# Patient Record
Sex: FEMALE | Race: WHITE | NOT HISPANIC OR LATINO | Employment: OTHER | URBAN - METROPOLITAN AREA
[De-identification: names, ages, dates, MRNs, and addresses within clinical notes are randomized per-mention and may not be internally consistent; named-entity substitution may affect disease eponyms.]

---

## 2017-01-23 ENCOUNTER — OFFICE VISIT - HEALTHEAST (OUTPATIENT)
Dept: INFECTIOUS DISEASES | Facility: CLINIC | Age: 67
End: 2017-01-23

## 2017-01-23 DIAGNOSIS — M00.861 ARTHRITIS OF RIGHT KNEE DUE TO OTHER BACTERIA (H): ICD-10-CM

## 2017-01-23 DIAGNOSIS — T84.59XD INFECTED PROSTHETIC KNEE JOINT, SUBSEQUENT ENCOUNTER: ICD-10-CM

## 2017-01-23 DIAGNOSIS — Z96.659 INFECTED PROSTHETIC KNEE JOINT, SUBSEQUENT ENCOUNTER: ICD-10-CM

## 2017-02-03 ENCOUNTER — OFFICE VISIT - HEALTHEAST (OUTPATIENT)
Dept: FAMILY MEDICINE | Facility: CLINIC | Age: 67
End: 2017-02-03

## 2017-02-03 DIAGNOSIS — I10 ESSENTIAL HYPERTENSION: ICD-10-CM

## 2017-02-22 ENCOUNTER — RECORDS - HEALTHEAST (OUTPATIENT)
Dept: ADMINISTRATIVE | Facility: OTHER | Age: 67
End: 2017-02-22

## 2017-02-22 ENCOUNTER — AMBULATORY - HEALTHEAST (OUTPATIENT)
Dept: NURSING | Facility: CLINIC | Age: 67
End: 2017-02-22

## 2017-03-01 ENCOUNTER — COMMUNICATION - HEALTHEAST (OUTPATIENT)
Dept: FAMILY MEDICINE | Facility: CLINIC | Age: 67
End: 2017-03-01

## 2017-03-22 ENCOUNTER — OFFICE VISIT - HEALTHEAST (OUTPATIENT)
Dept: FAMILY MEDICINE | Facility: CLINIC | Age: 67
End: 2017-03-22

## 2017-03-22 DIAGNOSIS — E66.9 OBESITY (BMI 30-39.9): ICD-10-CM

## 2017-03-22 DIAGNOSIS — M94.9 DISORDER OF BONE AND CARTILAGE: ICD-10-CM

## 2017-03-22 DIAGNOSIS — Z00.00 HEALTH MAINTENANCE EXAMINATION: ICD-10-CM

## 2017-03-22 DIAGNOSIS — M89.9 DISORDER OF BONE AND CARTILAGE: ICD-10-CM

## 2017-03-22 DIAGNOSIS — M17.10 LOCALIZED OSTEOARTHROSIS, LOWER LEG: ICD-10-CM

## 2017-03-22 DIAGNOSIS — E55.9 VITAMIN D DEFICIENCY: ICD-10-CM

## 2017-03-22 LAB
CHOLEST SERPL-MCNC: 236 MG/DL
FASTING STATUS PATIENT QL REPORTED: YES
HDLC SERPL-MCNC: 42 MG/DL
LDLC SERPL CALC-MCNC: 136 MG/DL
TRIGL SERPL-MCNC: 290 MG/DL

## 2017-03-22 ASSESSMENT — MIFFLIN-ST. JEOR: SCORE: 1348.05

## 2017-03-23 ENCOUNTER — COMMUNICATION - HEALTHEAST (OUTPATIENT)
Dept: FAMILY MEDICINE | Facility: CLINIC | Age: 67
End: 2017-03-23

## 2017-03-29 ENCOUNTER — RECORDS - HEALTHEAST (OUTPATIENT)
Dept: ADMINISTRATIVE | Facility: OTHER | Age: 67
End: 2017-03-29

## 2017-03-29 ENCOUNTER — RECORDS - HEALTHEAST (OUTPATIENT)
Dept: BONE DENSITY | Facility: CLINIC | Age: 67
End: 2017-03-29

## 2017-03-29 ENCOUNTER — COMMUNICATION - HEALTHEAST (OUTPATIENT)
Dept: FAMILY MEDICINE | Facility: CLINIC | Age: 67
End: 2017-03-29

## 2017-03-29 ENCOUNTER — HOSPITAL ENCOUNTER (OUTPATIENT)
Dept: MAMMOGRAPHY | Facility: HOSPITAL | Age: 67
Discharge: HOME | End: 2017-03-29
Attending: NURSE PRACTITIONER

## 2017-03-29 DIAGNOSIS — Z00.00 ENCOUNTER FOR GENERAL ADULT MEDICAL EXAMINATION WITHOUT ABNORMAL FINDINGS: ICD-10-CM

## 2017-03-29 DIAGNOSIS — Z00.00 HEALTH MAINTENANCE EXAMINATION: ICD-10-CM

## 2017-10-09 ENCOUNTER — COMMUNICATION - HEALTHEAST (OUTPATIENT)
Dept: FAMILY MEDICINE | Facility: CLINIC | Age: 67
End: 2017-10-09

## 2017-10-10 ENCOUNTER — AMBULATORY - HEALTHEAST (OUTPATIENT)
Dept: FAMILY MEDICINE | Facility: CLINIC | Age: 67
End: 2017-10-10

## 2017-10-11 ENCOUNTER — AMBULATORY - HEALTHEAST (OUTPATIENT)
Dept: FAMILY MEDICINE | Facility: CLINIC | Age: 67
End: 2017-10-11

## 2017-10-18 ENCOUNTER — AMBULATORY - HEALTHEAST (OUTPATIENT)
Dept: FAMILY MEDICINE | Facility: CLINIC | Age: 67
End: 2017-10-18

## 2017-10-18 ENCOUNTER — AMBULATORY - HEALTHEAST (OUTPATIENT)
Dept: LAB | Facility: CLINIC | Age: 67
End: 2017-10-18

## 2017-10-18 ENCOUNTER — COMMUNICATION - HEALTHEAST (OUTPATIENT)
Dept: FAMILY MEDICINE | Facility: CLINIC | Age: 67
End: 2017-10-18

## 2017-10-18 DIAGNOSIS — E78.5 ELEVATED FASTING LIPID PROFILE: ICD-10-CM

## 2017-10-18 DIAGNOSIS — R74.8 ELEVATED LIPASE: ICD-10-CM

## 2017-10-18 LAB
CHOLEST SERPL-MCNC: 271 MG/DL
FASTING STATUS PATIENT QL REPORTED: YES
HDLC SERPL-MCNC: 40 MG/DL
LDLC SERPL CALC-MCNC: 178 MG/DL
TRIGL SERPL-MCNC: 266 MG/DL

## 2017-10-25 ENCOUNTER — AMBULATORY - HEALTHEAST (OUTPATIENT)
Dept: FAMILY MEDICINE | Facility: CLINIC | Age: 67
End: 2017-10-25

## 2017-11-02 ENCOUNTER — COMMUNICATION - HEALTHEAST (OUTPATIENT)
Dept: SCHEDULING | Facility: CLINIC | Age: 67
End: 2017-11-02

## 2017-11-08 ENCOUNTER — COMMUNICATION - HEALTHEAST (OUTPATIENT)
Dept: FAMILY MEDICINE | Facility: CLINIC | Age: 67
End: 2017-11-08

## 2018-01-10 ENCOUNTER — COMMUNICATION - HEALTHEAST (OUTPATIENT)
Dept: FAMILY MEDICINE | Facility: CLINIC | Age: 68
End: 2018-01-10

## 2018-01-10 DIAGNOSIS — I10 BENIGN ESSENTIAL HTN: ICD-10-CM

## 2018-01-11 ENCOUNTER — OFFICE VISIT - HEALTHEAST (OUTPATIENT)
Dept: FAMILY MEDICINE | Facility: CLINIC | Age: 68
End: 2018-01-11

## 2018-01-11 DIAGNOSIS — I10 BENIGN ESSENTIAL HTN: ICD-10-CM

## 2018-01-11 DIAGNOSIS — J20.9 ACUTE BRONCHITIS: ICD-10-CM

## 2018-02-21 ENCOUNTER — AMBULATORY - HEALTHEAST (OUTPATIENT)
Dept: SCHEDULING | Facility: CLINIC | Age: 68
End: 2018-02-21

## 2018-02-21 ENCOUNTER — AMBULATORY - HEALTHEAST (OUTPATIENT)
Dept: FAMILY MEDICINE | Facility: CLINIC | Age: 68
End: 2018-02-21

## 2018-02-21 ENCOUNTER — COMMUNICATION - HEALTHEAST (OUTPATIENT)
Dept: FAMILY MEDICINE | Facility: CLINIC | Age: 68
End: 2018-02-21

## 2018-02-21 ENCOUNTER — RECORDS - HEALTHEAST (OUTPATIENT)
Dept: GENERAL RADIOLOGY | Facility: CLINIC | Age: 68
End: 2018-02-21

## 2018-02-21 ENCOUNTER — OFFICE VISIT - HEALTHEAST (OUTPATIENT)
Dept: FAMILY MEDICINE | Facility: CLINIC | Age: 68
End: 2018-02-21

## 2018-02-21 DIAGNOSIS — N85.2 BULKY OR ENLARGED UTERUS: ICD-10-CM

## 2018-02-21 DIAGNOSIS — M25.511 CHRONIC RIGHT SHOULDER PAIN: ICD-10-CM

## 2018-02-21 DIAGNOSIS — N39.3 STRESS INCONTINENCE OF URINE: ICD-10-CM

## 2018-02-21 DIAGNOSIS — G89.29 CHRONIC RIGHT SHOULDER PAIN: ICD-10-CM

## 2018-02-21 DIAGNOSIS — M25.811 SHOULDER IMPINGEMENT, RIGHT: ICD-10-CM

## 2018-02-21 DIAGNOSIS — K52.9 COLITIS: ICD-10-CM

## 2018-02-21 DIAGNOSIS — M94.9 DISORDER OF BONE AND CARTILAGE: ICD-10-CM

## 2018-02-21 DIAGNOSIS — I10 BENIGN ESSENTIAL HTN: ICD-10-CM

## 2018-02-21 DIAGNOSIS — N95.2 POSTMENOPAUSAL ATROPHIC VAGINITIS: ICD-10-CM

## 2018-02-21 DIAGNOSIS — G89.29 OTHER CHRONIC PAIN: ICD-10-CM

## 2018-02-21 DIAGNOSIS — J31.0 CHRONIC RHINITIS: ICD-10-CM

## 2018-02-21 DIAGNOSIS — E78.00 HYPERCHOLESTEREMIA: ICD-10-CM

## 2018-02-21 DIAGNOSIS — Z00.00 ROUTINE GENERAL MEDICAL EXAMINATION AT A HEALTH CARE FACILITY: ICD-10-CM

## 2018-02-21 DIAGNOSIS — M25.511 PAIN IN RIGHT SHOULDER: ICD-10-CM

## 2018-02-21 DIAGNOSIS — M89.9 DISORDER OF BONE AND CARTILAGE: ICD-10-CM

## 2018-02-21 LAB
ALBUMIN SERPL-MCNC: 3.7 G/DL (ref 3.5–5)
ALP SERPL-CCNC: 84 U/L (ref 45–120)
ALT SERPL W P-5'-P-CCNC: 18 U/L (ref 0–45)
ANION GAP SERPL CALCULATED.3IONS-SCNC: 10 MMOL/L (ref 5–18)
AST SERPL W P-5'-P-CCNC: 22 U/L (ref 0–40)
BILIRUB SERPL-MCNC: 0.4 MG/DL (ref 0–1)
BUN SERPL-MCNC: 20 MG/DL (ref 8–22)
CALCIUM SERPL-MCNC: 9.9 MG/DL (ref 8.5–10.5)
CHLORIDE BLD-SCNC: 105 MMOL/L (ref 98–107)
CO2 SERPL-SCNC: 27 MMOL/L (ref 22–31)
CREAT SERPL-MCNC: 0.82 MG/DL (ref 0.6–1.1)
GFR SERPL CREATININE-BSD FRML MDRD: >60 ML/MIN/1.73M2
GLUCOSE BLD-MCNC: 98 MG/DL (ref 70–125)
POTASSIUM BLD-SCNC: 4.6 MMOL/L (ref 3.5–5)
PROT SERPL-MCNC: 7.2 G/DL (ref 6–8)
SODIUM SERPL-SCNC: 142 MMOL/L (ref 136–145)

## 2018-02-21 ASSESSMENT — MIFFLIN-ST. JEOR: SCORE: 1345.21

## 2018-02-28 ENCOUNTER — AMBULATORY - HEALTHEAST (OUTPATIENT)
Dept: SCHEDULING | Facility: CLINIC | Age: 68
End: 2018-02-28

## 2018-02-28 ENCOUNTER — COMMUNICATION - HEALTHEAST (OUTPATIENT)
Dept: FAMILY MEDICINE | Facility: CLINIC | Age: 68
End: 2018-02-28

## 2018-02-28 ENCOUNTER — AMBULATORY - HEALTHEAST (OUTPATIENT)
Dept: FAMILY MEDICINE | Facility: CLINIC | Age: 68
End: 2018-02-28

## 2018-02-28 DIAGNOSIS — N83.8 OVARIAN MASS: ICD-10-CM

## 2018-02-28 DIAGNOSIS — R19.00 PELVIC MASS IN FEMALE: ICD-10-CM

## 2018-03-04 ENCOUNTER — AMBULATORY - HEALTHEAST (OUTPATIENT)
Dept: FAMILY MEDICINE | Facility: CLINIC | Age: 68
End: 2018-03-04

## 2018-03-05 ENCOUNTER — AMBULATORY - HEALTHEAST (OUTPATIENT)
Dept: FAMILY MEDICINE | Facility: CLINIC | Age: 68
End: 2018-03-05

## 2018-03-05 ENCOUNTER — COMMUNICATION - HEALTHEAST (OUTPATIENT)
Dept: ONCOLOGY | Facility: HOSPITAL | Age: 68
End: 2018-03-05

## 2018-03-05 ENCOUNTER — COMMUNICATION - HEALTHEAST (OUTPATIENT)
Dept: FAMILY MEDICINE | Facility: CLINIC | Age: 68
End: 2018-03-05

## 2018-03-05 DIAGNOSIS — C56.1 MALIGNANT NEOPLASM OF RIGHT OVARY (H): ICD-10-CM

## 2018-03-05 DIAGNOSIS — N83.8 OVARIAN MASS: ICD-10-CM

## 2018-03-06 ENCOUNTER — COMMUNICATION - HEALTHEAST (OUTPATIENT)
Dept: FAMILY MEDICINE | Facility: CLINIC | Age: 68
End: 2018-03-06

## 2018-03-08 ENCOUNTER — RECORDS - HEALTHEAST (OUTPATIENT)
Dept: ADMINISTRATIVE | Facility: OTHER | Age: 68
End: 2018-03-08

## 2018-03-09 ENCOUNTER — COMMUNICATION - HEALTHEAST (OUTPATIENT)
Dept: FAMILY MEDICINE | Facility: CLINIC | Age: 68
End: 2018-03-09

## 2018-03-09 ENCOUNTER — OFFICE VISIT - HEALTHEAST (OUTPATIENT)
Dept: FAMILY MEDICINE | Facility: CLINIC | Age: 68
End: 2018-03-09

## 2018-03-09 DIAGNOSIS — N83.8 OVARIAN MASS: ICD-10-CM

## 2018-03-09 DIAGNOSIS — Z01.818 PRE-OP EXAMINATION: ICD-10-CM

## 2018-03-09 LAB
ATRIAL RATE - MUSE: 76 BPM
DIASTOLIC BLOOD PRESSURE - MUSE: NORMAL MMHG
HGB BLD-MCNC: 13.3 G/DL (ref 12–16)
INTERPRETATION ECG - MUSE: NORMAL
P AXIS - MUSE: 51 DEGREES
PR INTERVAL - MUSE: 138 MS
QRS DURATION - MUSE: 74 MS
QT - MUSE: 368 MS
QTC - MUSE: 414 MS
R AXIS - MUSE: 13 DEGREES
SYSTOLIC BLOOD PRESSURE - MUSE: NORMAL MMHG
T AXIS - MUSE: 38 DEGREES
VENTRICULAR RATE- MUSE: 76 BPM

## 2018-03-09 ASSESSMENT — MIFFLIN-ST. JEOR: SCORE: 1334.72

## 2018-03-12 ENCOUNTER — RECORDS - HEALTHEAST (OUTPATIENT)
Dept: ADMINISTRATIVE | Facility: OTHER | Age: 68
End: 2018-03-12

## 2018-03-29 ENCOUNTER — RECORDS - HEALTHEAST (OUTPATIENT)
Dept: ADMINISTRATIVE | Facility: OTHER | Age: 68
End: 2018-03-29

## 2018-07-11 ENCOUNTER — COMMUNICATION - HEALTHEAST (OUTPATIENT)
Dept: FAMILY MEDICINE | Facility: CLINIC | Age: 68
End: 2018-07-11

## 2018-07-18 ENCOUNTER — RECORDS - HEALTHEAST (OUTPATIENT)
Dept: ADMINISTRATIVE | Facility: OTHER | Age: 68
End: 2018-07-18

## 2018-08-29 ENCOUNTER — RECORDS - HEALTHEAST (OUTPATIENT)
Dept: ADMINISTRATIVE | Facility: OTHER | Age: 68
End: 2018-08-29

## 2018-09-26 ENCOUNTER — AMBULATORY - HEALTHEAST (OUTPATIENT)
Dept: LAB | Facility: CLINIC | Age: 68
End: 2018-09-26

## 2018-09-26 DIAGNOSIS — E78.00 HYPERCHOLESTEREMIA: ICD-10-CM

## 2018-09-26 LAB
ALBUMIN SERPL-MCNC: 4 G/DL (ref 3.5–5)
ALP SERPL-CCNC: 76 U/L (ref 45–120)
ALT SERPL W P-5'-P-CCNC: 19 U/L (ref 0–45)
AST SERPL W P-5'-P-CCNC: 20 U/L (ref 0–40)
BILIRUB DIRECT SERPL-MCNC: 0.1 MG/DL
BILIRUB SERPL-MCNC: 0.3 MG/DL (ref 0–1)
CHOLEST SERPL-MCNC: 196 MG/DL
FASTING STATUS PATIENT QL REPORTED: YES
HDLC SERPL-MCNC: 52 MG/DL
LDLC SERPL CALC-MCNC: 118 MG/DL
PROT SERPL-MCNC: 7 G/DL (ref 6–8)
TRIGL SERPL-MCNC: 129 MG/DL

## 2018-09-28 ENCOUNTER — COMMUNICATION - HEALTHEAST (OUTPATIENT)
Dept: FAMILY MEDICINE | Facility: CLINIC | Age: 68
End: 2018-09-28

## 2018-09-29 ENCOUNTER — AMBULATORY - HEALTHEAST (OUTPATIENT)
Dept: FAMILY MEDICINE | Facility: CLINIC | Age: 68
End: 2018-09-29

## 2019-01-07 ENCOUNTER — OFFICE VISIT - HEALTHEAST (OUTPATIENT)
Dept: FAMILY MEDICINE | Facility: CLINIC | Age: 69
End: 2019-01-07

## 2019-01-07 DIAGNOSIS — J20.9 ACUTE BRONCHITIS WITH SYMPTOMS GREATER THAN 10 DAYS: ICD-10-CM

## 2019-02-06 ENCOUNTER — COMMUNICATION - HEALTHEAST (OUTPATIENT)
Dept: FAMILY MEDICINE | Facility: CLINIC | Age: 69
End: 2019-02-06

## 2019-03-19 ENCOUNTER — OFFICE VISIT - HEALTHEAST (OUTPATIENT)
Dept: FAMILY MEDICINE | Facility: CLINIC | Age: 69
End: 2019-03-19

## 2019-03-19 DIAGNOSIS — R35.0 URINARY FREQUENCY: ICD-10-CM

## 2019-03-19 DIAGNOSIS — Z12.31 VISIT FOR SCREENING MAMMOGRAM: ICD-10-CM

## 2019-03-19 DIAGNOSIS — Z01.818 ENCOUNTER FOR PREOPERATIVE EXAMINATION FOR GENERAL SURGICAL PROCEDURE: ICD-10-CM

## 2019-03-19 DIAGNOSIS — E78.00 HYPERCHOLESTEREMIA: ICD-10-CM

## 2019-03-19 DIAGNOSIS — M17.10 LOCALIZED OSTEOARTHROSIS, LOWER LEG: ICD-10-CM

## 2019-03-19 DIAGNOSIS — Z01.818 PREOP GENERAL PHYSICAL EXAM: ICD-10-CM

## 2019-03-19 DIAGNOSIS — I10 BENIGN ESSENTIAL HTN: ICD-10-CM

## 2019-03-19 DIAGNOSIS — E66.9 OBESITY (BMI 30-39.9): ICD-10-CM

## 2019-03-19 LAB
ALBUMIN SERPL-MCNC: 3.9 G/DL (ref 3.5–5)
ALBUMIN UR-MCNC: NEGATIVE MG/DL
ANION GAP SERPL CALCULATED.3IONS-SCNC: 10 MMOL/L (ref 5–18)
APPEARANCE UR: CLEAR
BACTERIA #/AREA URNS HPF: ABNORMAL HPF
BILIRUB UR QL STRIP: NEGATIVE
BUN SERPL-MCNC: 24 MG/DL (ref 8–28)
CALCIUM SERPL-MCNC: 10.3 MG/DL (ref 8.5–10.5)
CHLORIDE BLD-SCNC: 106 MMOL/L (ref 98–107)
CO2 SERPL-SCNC: 25 MMOL/L (ref 22–31)
COLOR UR AUTO: YELLOW
CREAT SERPL-MCNC: 1 MG/DL (ref 0.6–1.1)
GFR SERPL CREATININE-BSD FRML MDRD: 55 ML/MIN/1.73M2
GLUCOSE BLD-MCNC: 96 MG/DL (ref 70–125)
GLUCOSE UR STRIP-MCNC: NEGATIVE MG/DL
HGB BLD-MCNC: 12.6 G/DL (ref 12–16)
HGB UR QL STRIP: NEGATIVE
KETONES UR STRIP-MCNC: ABNORMAL MG/DL
LEUKOCYTE ESTERASE UR QL STRIP: ABNORMAL
NITRATE UR QL: NEGATIVE
PH UR STRIP: 5 [PH] (ref 5–8)
PHOSPHATE SERPL-MCNC: 3.9 MG/DL (ref 2.5–4.5)
POTASSIUM BLD-SCNC: 4.4 MMOL/L (ref 3.5–5)
RBC #/AREA URNS AUTO: ABNORMAL HPF
SODIUM SERPL-SCNC: 141 MMOL/L (ref 136–145)
SP GR UR STRIP: >=1.03 (ref 1–1.03)
SQUAMOUS #/AREA URNS AUTO: ABNORMAL LPF
UROBILINOGEN UR STRIP-ACNC: ABNORMAL
WBC #/AREA URNS AUTO: ABNORMAL HPF

## 2019-03-20 LAB
ATRIAL RATE - MUSE: 71 BPM
BACTERIA SPEC CULT: NO GROWTH
DIASTOLIC BLOOD PRESSURE - MUSE: NORMAL MMHG
INTERPRETATION ECG - MUSE: NORMAL
P AXIS - MUSE: 50 DEGREES
PR INTERVAL - MUSE: 144 MS
QRS DURATION - MUSE: 78 MS
QT - MUSE: 392 MS
QTC - MUSE: 425 MS
R AXIS - MUSE: 29 DEGREES
SYSTOLIC BLOOD PRESSURE - MUSE: NORMAL MMHG
T AXIS - MUSE: 42 DEGREES
VENTRICULAR RATE- MUSE: 71 BPM

## 2019-03-25 ASSESSMENT — MIFFLIN-ST. JEOR: SCORE: 1356.83

## 2019-03-26 ENCOUNTER — ANESTHESIA - HEALTHEAST (OUTPATIENT)
Dept: SURGERY | Facility: CLINIC | Age: 69
End: 2019-03-26

## 2019-03-27 ENCOUNTER — DOCUMENTATION ONLY (OUTPATIENT)
Dept: OTHER | Facility: CLINIC | Age: 69
End: 2019-03-27

## 2019-03-27 ENCOUNTER — SURGERY - HEALTHEAST (OUTPATIENT)
Dept: SURGERY | Facility: CLINIC | Age: 69
End: 2019-03-27

## 2019-03-27 ENCOUNTER — AMBULATORY - HEALTHEAST (OUTPATIENT)
Dept: OTHER | Facility: CLINIC | Age: 69
End: 2019-03-27

## 2019-03-27 ASSESSMENT — MIFFLIN-ST. JEOR: SCORE: 1336.14

## 2019-03-28 ENCOUNTER — HOME CARE/HOSPICE - HEALTHEAST (OUTPATIENT)
Dept: HOME HEALTH SERVICES | Facility: HOME HEALTH | Age: 69
End: 2019-03-28

## 2019-04-01 ENCOUNTER — HOME CARE/HOSPICE - HEALTHEAST (OUTPATIENT)
Dept: HOME HEALTH SERVICES | Facility: HOME HEALTH | Age: 69
End: 2019-04-01

## 2019-04-01 ENCOUNTER — RECORDS - HEALTHEAST (OUTPATIENT)
Dept: ADMINISTRATIVE | Facility: OTHER | Age: 69
End: 2019-04-01

## 2019-04-01 ENCOUNTER — COMMUNICATION - HEALTHEAST (OUTPATIENT)
Dept: FAMILY MEDICINE | Facility: CLINIC | Age: 69
End: 2019-04-01

## 2019-04-02 ENCOUNTER — HOME CARE/HOSPICE - HEALTHEAST (OUTPATIENT)
Dept: HOME HEALTH SERVICES | Facility: HOME HEALTH | Age: 69
End: 2019-04-02

## 2019-04-04 ENCOUNTER — HOME CARE/HOSPICE - HEALTHEAST (OUTPATIENT)
Dept: HOME HEALTH SERVICES | Facility: HOME HEALTH | Age: 69
End: 2019-04-04

## 2019-04-05 ENCOUNTER — HOME CARE/HOSPICE - HEALTHEAST (OUTPATIENT)
Dept: HOME HEALTH SERVICES | Facility: HOME HEALTH | Age: 69
End: 2019-04-05

## 2019-04-08 ENCOUNTER — HOME CARE/HOSPICE - HEALTHEAST (OUTPATIENT)
Dept: HOME HEALTH SERVICES | Facility: HOME HEALTH | Age: 69
End: 2019-04-08

## 2019-04-09 ENCOUNTER — RECORDS - HEALTHEAST (OUTPATIENT)
Dept: ADMINISTRATIVE | Facility: OTHER | Age: 69
End: 2019-04-09

## 2019-04-11 ENCOUNTER — HOME CARE/HOSPICE - HEALTHEAST (OUTPATIENT)
Dept: HOME HEALTH SERVICES | Facility: HOME HEALTH | Age: 69
End: 2019-04-11

## 2019-04-12 ENCOUNTER — HOME CARE/HOSPICE - HEALTHEAST (OUTPATIENT)
Dept: HOME HEALTH SERVICES | Facility: HOME HEALTH | Age: 69
End: 2019-04-12

## 2019-04-15 ENCOUNTER — HOME CARE/HOSPICE - HEALTHEAST (OUTPATIENT)
Dept: HOME HEALTH SERVICES | Facility: HOME HEALTH | Age: 69
End: 2019-04-15

## 2019-04-15 ENCOUNTER — COMMUNICATION - HEALTHEAST (OUTPATIENT)
Dept: PHYSICAL THERAPY | Facility: CLINIC | Age: 69
End: 2019-04-15

## 2019-04-18 ENCOUNTER — HOME CARE/HOSPICE - HEALTHEAST (OUTPATIENT)
Dept: HOME HEALTH SERVICES | Facility: HOME HEALTH | Age: 69
End: 2019-04-18

## 2019-04-25 ENCOUNTER — AMBULATORY - HEALTHEAST (OUTPATIENT)
Dept: NURSING | Facility: CLINIC | Age: 69
End: 2019-04-25

## 2019-04-25 ENCOUNTER — COMMUNICATION - HEALTHEAST (OUTPATIENT)
Dept: FAMILY MEDICINE | Facility: CLINIC | Age: 69
End: 2019-04-25

## 2019-04-25 DIAGNOSIS — I10 BENIGN ESSENTIAL HTN: ICD-10-CM

## 2019-04-26 ENCOUNTER — COMMUNICATION - HEALTHEAST (OUTPATIENT)
Dept: FAMILY MEDICINE | Facility: CLINIC | Age: 69
End: 2019-04-26

## 2019-04-30 ENCOUNTER — OFFICE VISIT - HEALTHEAST (OUTPATIENT)
Dept: FAMILY MEDICINE | Facility: CLINIC | Age: 69
End: 2019-04-30

## 2019-04-30 DIAGNOSIS — I10 BENIGN ESSENTIAL HYPERTENSION: ICD-10-CM

## 2019-04-30 DIAGNOSIS — M25.522 PAIN IN JOINT, UPPER ARM, LEFT: ICD-10-CM

## 2019-05-07 ENCOUNTER — RECORDS - HEALTHEAST (OUTPATIENT)
Dept: ADMINISTRATIVE | Facility: OTHER | Age: 69
End: 2019-05-07

## 2019-05-07 ENCOUNTER — OFFICE VISIT - HEALTHEAST (OUTPATIENT)
Dept: FAMILY MEDICINE | Facility: CLINIC | Age: 69
End: 2019-05-07

## 2019-05-07 DIAGNOSIS — N95.2 ATROPHIC VAGINITIS: ICD-10-CM

## 2019-05-07 DIAGNOSIS — D62 ANEMIA DUE TO BLOOD LOSS, ACUTE: ICD-10-CM

## 2019-05-07 DIAGNOSIS — D50.8 OTHER IRON DEFICIENCY ANEMIA: ICD-10-CM

## 2019-05-07 DIAGNOSIS — E66.9 OBESITY (BMI 30-39.9): ICD-10-CM

## 2019-05-07 DIAGNOSIS — I10 BENIGN ESSENTIAL HTN: ICD-10-CM

## 2019-05-07 DIAGNOSIS — E78.00 HYPERCHOLESTEREMIA: ICD-10-CM

## 2019-05-07 DIAGNOSIS — M94.9 DISORDER OF BONE AND CARTILAGE: ICD-10-CM

## 2019-05-07 DIAGNOSIS — Z00.00 ROUTINE GENERAL MEDICAL EXAMINATION AT A HEALTH CARE FACILITY: ICD-10-CM

## 2019-05-07 DIAGNOSIS — G25.81 RESTLESS LEG SYNDROME: ICD-10-CM

## 2019-05-07 DIAGNOSIS — M89.9 DISORDER OF BONE AND CARTILAGE: ICD-10-CM

## 2019-05-07 DIAGNOSIS — Z12.31 ENCOUNTER FOR SCREENING MAMMOGRAM FOR MALIGNANT NEOPLASM OF BREAST: ICD-10-CM

## 2019-05-07 DIAGNOSIS — Z96.652 STATUS POST TOTAL LEFT KNEE REPLACEMENT: ICD-10-CM

## 2019-05-07 LAB
ERYTHROCYTE [DISTWIDTH] IN BLOOD BY AUTOMATED COUNT: 11.9 % (ref 11–14.5)
FERRITIN SERPL-MCNC: 88 NG/ML (ref 10–130)
HCT VFR BLD AUTO: 36.2 % (ref 35–47)
HGB BLD-MCNC: 11.7 G/DL (ref 12–16)
MCH RBC QN AUTO: 28.2 PG (ref 27–34)
MCHC RBC AUTO-ENTMCNC: 32.2 G/DL (ref 32–36)
MCV RBC AUTO: 88 FL (ref 80–100)
PLATELET # BLD AUTO: 367 THOU/UL (ref 140–440)
PMV BLD AUTO: 8.3 FL (ref 7–10)
RBC # BLD AUTO: 4.13 MILL/UL (ref 3.8–5.4)
WBC: 6.8 THOU/UL (ref 4–11)

## 2019-05-07 ASSESSMENT — MIFFLIN-ST. JEOR: SCORE: 1321.14

## 2019-08-28 ENCOUNTER — HOSPITAL ENCOUNTER (OUTPATIENT)
Dept: MAMMOGRAPHY | Facility: CLINIC | Age: 69
Discharge: HOME | End: 2019-08-28
Attending: FAMILY MEDICINE

## 2019-08-28 DIAGNOSIS — Z12.31 ENCOUNTER FOR SCREENING MAMMOGRAM FOR MALIGNANT NEOPLASM OF BREAST: ICD-10-CM

## 2019-08-28 DIAGNOSIS — Z00.00 ROUTINE GENERAL MEDICAL EXAMINATION AT A HEALTH CARE FACILITY: ICD-10-CM

## 2019-09-10 ENCOUNTER — RECORDS - HEALTHEAST (OUTPATIENT)
Dept: ADMINISTRATIVE | Facility: OTHER | Age: 69
End: 2019-09-10

## 2019-11-05 ENCOUNTER — COMMUNICATION - HEALTHEAST (OUTPATIENT)
Dept: FAMILY MEDICINE | Facility: CLINIC | Age: 69
End: 2019-11-05

## 2019-11-05 DIAGNOSIS — I10 BENIGN ESSENTIAL HTN: ICD-10-CM

## 2019-11-05 DIAGNOSIS — E78.00 HYPERCHOLESTEREMIA: ICD-10-CM

## 2019-12-11 ENCOUNTER — OFFICE VISIT - HEALTHEAST (OUTPATIENT)
Dept: FAMILY MEDICINE | Facility: CLINIC | Age: 69
End: 2019-12-11

## 2019-12-11 DIAGNOSIS — I10 BENIGN ESSENTIAL HTN: ICD-10-CM

## 2019-12-11 DIAGNOSIS — Z23 FLU VACCINE NEED: ICD-10-CM

## 2019-12-11 DIAGNOSIS — K21.9 GASTROESOPHAGEAL REFLUX DISEASE WITHOUT ESOPHAGITIS: ICD-10-CM

## 2019-12-11 DIAGNOSIS — Z78.0 POST-MENOPAUSAL: ICD-10-CM

## 2019-12-11 DIAGNOSIS — E78.00 HYPERCHOLESTEREMIA: ICD-10-CM

## 2019-12-11 LAB
ALBUMIN SERPL-MCNC: 4 G/DL (ref 3.5–5)
ALP SERPL-CCNC: 88 U/L (ref 45–120)
ALT SERPL W P-5'-P-CCNC: 18 U/L (ref 0–45)
ANION GAP SERPL CALCULATED.3IONS-SCNC: 10 MMOL/L (ref 5–18)
AST SERPL W P-5'-P-CCNC: 22 U/L (ref 0–40)
BILIRUB SERPL-MCNC: 0.3 MG/DL (ref 0–1)
BUN SERPL-MCNC: 22 MG/DL (ref 8–28)
CALCIUM SERPL-MCNC: 10.1 MG/DL (ref 8.5–10.5)
CHLORIDE BLD-SCNC: 107 MMOL/L (ref 98–107)
CHOLEST SERPL-MCNC: 220 MG/DL
CO2 SERPL-SCNC: 25 MMOL/L (ref 22–31)
CREAT SERPL-MCNC: 0.96 MG/DL (ref 0.6–1.1)
FASTING STATUS PATIENT QL REPORTED: YES
GFR SERPL CREATININE-BSD FRML MDRD: 57 ML/MIN/1.73M2
GLUCOSE BLD-MCNC: 93 MG/DL (ref 70–125)
HDLC SERPL-MCNC: 51 MG/DL
LDLC SERPL CALC-MCNC: 146 MG/DL
POTASSIUM BLD-SCNC: 4.5 MMOL/L (ref 3.5–5)
PROT SERPL-MCNC: 7.1 G/DL (ref 6–8)
SODIUM SERPL-SCNC: 142 MMOL/L (ref 136–145)
TRIGL SERPL-MCNC: 116 MG/DL

## 2019-12-11 ASSESSMENT — MIFFLIN-ST. JEOR: SCORE: 1321.8

## 2019-12-12 ENCOUNTER — AMBULATORY - HEALTHEAST (OUTPATIENT)
Dept: SCHEDULING | Facility: CLINIC | Age: 69
End: 2019-12-12

## 2019-12-12 ENCOUNTER — COMMUNICATION - HEALTHEAST (OUTPATIENT)
Dept: FAMILY MEDICINE | Facility: CLINIC | Age: 69
End: 2019-12-12

## 2019-12-12 DIAGNOSIS — Z78.0 POST-MENOPAUSAL: ICD-10-CM

## 2019-12-18 ENCOUNTER — TRANSFERRED RECORDS (OUTPATIENT)
Dept: HEALTH INFORMATION MANAGEMENT | Facility: CLINIC | Age: 69
End: 2019-12-18

## 2019-12-21 ENCOUNTER — COMMUNICATION - HEALTHEAST (OUTPATIENT)
Dept: FAMILY MEDICINE | Facility: CLINIC | Age: 69
End: 2019-12-21

## 2020-01-22 ENCOUNTER — OFFICE VISIT - HEALTHEAST (OUTPATIENT)
Dept: FAMILY MEDICINE | Facility: CLINIC | Age: 70
End: 2020-01-22

## 2020-01-22 DIAGNOSIS — Z12.11 SPECIAL SCREENING FOR MALIGNANT NEOPLASMS, COLON: ICD-10-CM

## 2020-01-22 DIAGNOSIS — M81.0 POSTMENOPAUSAL BONE LOSS: ICD-10-CM

## 2020-01-22 DIAGNOSIS — E78.00 HYPERCHOLESTEREMIA: ICD-10-CM

## 2020-01-22 LAB
ALBUMIN SERPL-MCNC: 3.7 G/DL (ref 3.5–5)
ALP SERPL-CCNC: 110 U/L (ref 45–120)
ALT SERPL W P-5'-P-CCNC: 20 U/L (ref 0–45)
ANION GAP SERPL CALCULATED.3IONS-SCNC: 11 MMOL/L (ref 5–18)
AST SERPL W P-5'-P-CCNC: 23 U/L (ref 0–40)
BILIRUB SERPL-MCNC: 0.3 MG/DL (ref 0–1)
BUN SERPL-MCNC: 22 MG/DL (ref 8–28)
CALCIUM SERPL-MCNC: 10.2 MG/DL (ref 8.5–10.5)
CHLORIDE BLD-SCNC: 106 MMOL/L (ref 98–107)
CHOLEST SERPL-MCNC: 253 MG/DL
CO2 SERPL-SCNC: 24 MMOL/L (ref 22–31)
CREAT SERPL-MCNC: 0.83 MG/DL (ref 0.6–1.1)
FASTING STATUS PATIENT QL REPORTED: YES
GFR SERPL CREATININE-BSD FRML MDRD: >60 ML/MIN/1.73M2
GLUCOSE BLD-MCNC: 94 MG/DL (ref 70–125)
HDLC SERPL-MCNC: 46 MG/DL
LDLC SERPL CALC-MCNC: 156 MG/DL
POTASSIUM BLD-SCNC: 4.7 MMOL/L (ref 3.5–5)
PROT SERPL-MCNC: 7 G/DL (ref 6–8)
PTH-INTACT SERPL-MCNC: 62 PG/ML (ref 10–86)
SODIUM SERPL-SCNC: 141 MMOL/L (ref 136–145)
TRIGL SERPL-MCNC: 254 MG/DL

## 2020-01-23 ENCOUNTER — COMMUNICATION - HEALTHEAST (OUTPATIENT)
Dept: FAMILY MEDICINE | Facility: CLINIC | Age: 70
End: 2020-01-23

## 2020-01-23 LAB
25(OH)D3 SERPL-MCNC: 29.3 NG/ML (ref 30–80)
25(OH)D3 SERPL-MCNC: 29.3 NG/ML (ref 30–80)

## 2020-01-27 ENCOUNTER — COMMUNICATION - HEALTHEAST (OUTPATIENT)
Dept: FAMILY MEDICINE | Facility: CLINIC | Age: 70
End: 2020-01-27

## 2020-02-12 ENCOUNTER — TRANSFERRED RECORDS (OUTPATIENT)
Dept: MULTI SPECIALTY CLINIC | Facility: CLINIC | Age: 70
End: 2020-02-12

## 2020-02-12 ENCOUNTER — RECORDS - HEALTHEAST (OUTPATIENT)
Dept: ADMINISTRATIVE | Facility: OTHER | Age: 70
End: 2020-02-12

## 2020-02-12 LAB
COLOGUARD-ABSTRACT: NEGATIVE
COLOGUARD-ABSTRACT: NEGATIVE

## 2020-02-21 ENCOUNTER — COMMUNICATION - HEALTHEAST (OUTPATIENT)
Dept: FAMILY MEDICINE | Facility: CLINIC | Age: 70
End: 2020-02-21

## 2020-02-24 ENCOUNTER — RECORDS - HEALTHEAST (OUTPATIENT)
Dept: HEALTH INFORMATION MANAGEMENT | Facility: CLINIC | Age: 70
End: 2020-02-24

## 2020-02-24 ENCOUNTER — COMMUNICATION - HEALTHEAST (OUTPATIENT)
Dept: FAMILY MEDICINE | Facility: CLINIC | Age: 70
End: 2020-02-24

## 2020-03-16 ENCOUNTER — COMMUNICATION - HEALTHEAST (OUTPATIENT)
Dept: FAMILY MEDICINE | Facility: CLINIC | Age: 70
End: 2020-03-16

## 2020-03-16 DIAGNOSIS — I10 BENIGN ESSENTIAL HTN: ICD-10-CM

## 2020-03-30 ENCOUNTER — COMMUNICATION - HEALTHEAST (OUTPATIENT)
Dept: FAMILY MEDICINE | Facility: CLINIC | Age: 70
End: 2020-03-30

## 2020-04-08 ENCOUNTER — COMMUNICATION - HEALTHEAST (OUTPATIENT)
Dept: CARDIOLOGY | Facility: CLINIC | Age: 70
End: 2020-04-08

## 2020-04-09 ENCOUNTER — OFFICE VISIT - HEALTHEAST (OUTPATIENT)
Dept: FAMILY MEDICINE | Facility: CLINIC | Age: 70
End: 2020-04-09

## 2020-04-09 DIAGNOSIS — R05.9 COUGH: ICD-10-CM

## 2020-11-04 ENCOUNTER — OFFICE VISIT - HEALTHEAST (OUTPATIENT)
Dept: FAMILY MEDICINE | Facility: CLINIC | Age: 70
End: 2020-11-04

## 2020-11-04 DIAGNOSIS — G25.81 RESTLESS LEG SYNDROME: ICD-10-CM

## 2020-11-04 DIAGNOSIS — I10 BENIGN ESSENTIAL HYPERTENSION: ICD-10-CM

## 2020-11-04 DIAGNOSIS — E55.9 HYPOVITAMINOSIS D: ICD-10-CM

## 2020-11-04 DIAGNOSIS — E78.5 HYPERLIPIDEMIA LDL GOAL <130: ICD-10-CM

## 2020-11-04 LAB
ALBUMIN SERPL-MCNC: 3.7 G/DL (ref 3.5–5)
ALP SERPL-CCNC: 110 U/L (ref 45–120)
ALT SERPL W P-5'-P-CCNC: 16 U/L (ref 0–45)
ANION GAP SERPL CALCULATED.3IONS-SCNC: 9 MMOL/L (ref 5–18)
AST SERPL W P-5'-P-CCNC: 20 U/L (ref 0–40)
BILIRUB SERPL-MCNC: 0.3 MG/DL (ref 0–1)
BUN SERPL-MCNC: 23 MG/DL (ref 8–28)
CALCIUM SERPL-MCNC: 9.9 MG/DL (ref 8.5–10.5)
CHLORIDE BLD-SCNC: 108 MMOL/L (ref 98–107)
CHOLEST SERPL-MCNC: 247 MG/DL
CO2 SERPL-SCNC: 25 MMOL/L (ref 22–31)
CREAT SERPL-MCNC: 0.76 MG/DL (ref 0.6–1.1)
FASTING STATUS PATIENT QL REPORTED: YES
GFR SERPL CREATININE-BSD FRML MDRD: >60 ML/MIN/1.73M2
GLUCOSE BLD-MCNC: 92 MG/DL (ref 70–125)
HDLC SERPL-MCNC: 48 MG/DL
LDLC SERPL CALC-MCNC: 163 MG/DL
POTASSIUM BLD-SCNC: 4.3 MMOL/L (ref 3.5–5)
PROT SERPL-MCNC: 6.7 G/DL (ref 6–8)
SODIUM SERPL-SCNC: 142 MMOL/L (ref 136–145)
TRIGL SERPL-MCNC: 181 MG/DL

## 2020-11-04 ASSESSMENT — MIFFLIN-ST. JEOR: SCORE: 1317.15

## 2020-11-05 LAB
25(OH)D3 SERPL-MCNC: 36.7 NG/ML (ref 30–80)
25(OH)D3 SERPL-MCNC: 36.7 NG/ML (ref 30–80)

## 2020-11-30 ENCOUNTER — COMMUNICATION - HEALTHEAST (OUTPATIENT)
Dept: FAMILY MEDICINE | Facility: CLINIC | Age: 70
End: 2020-11-30

## 2020-11-30 DIAGNOSIS — I10 BENIGN ESSENTIAL HYPERTENSION: ICD-10-CM

## 2020-12-03 ENCOUNTER — COMMUNICATION - HEALTHEAST (OUTPATIENT)
Dept: FAMILY MEDICINE | Facility: CLINIC | Age: 70
End: 2020-12-03

## 2020-12-03 DIAGNOSIS — I10 BENIGN ESSENTIAL HYPERTENSION: ICD-10-CM

## 2020-12-15 ENCOUNTER — OFFICE VISIT (OUTPATIENT)
Dept: FAMILY MEDICINE | Facility: CLINIC | Age: 70
End: 2020-12-15
Payer: COMMERCIAL

## 2020-12-15 VITALS
HEIGHT: 62 IN | WEIGHT: 185.5 LBS | HEART RATE: 84 BPM | BODY MASS INDEX: 34.14 KG/M2 | DIASTOLIC BLOOD PRESSURE: 84 MMHG | SYSTOLIC BLOOD PRESSURE: 138 MMHG | TEMPERATURE: 97.1 F | RESPIRATION RATE: 16 BRPM

## 2020-12-15 DIAGNOSIS — I10 BENIGN ESSENTIAL HYPERTENSION: Primary | ICD-10-CM

## 2020-12-15 LAB
ANION GAP SERPL CALCULATED.3IONS-SCNC: 6 MMOL/L (ref 3–14)
BUN SERPL-MCNC: 23 MG/DL (ref 7–30)
CALCIUM SERPL-MCNC: 9.9 MG/DL (ref 8.5–10.1)
CHLORIDE SERPL-SCNC: 103 MMOL/L (ref 94–109)
CO2 SERPL-SCNC: 31 MMOL/L (ref 20–32)
CREAT SERPL-MCNC: 0.86 MG/DL (ref 0.52–1.04)
GFR SERPL CREATININE-BSD FRML MDRD: 67 ML/MIN/{1.73_M2}
GLUCOSE SERPL-MCNC: 102 MG/DL (ref 70–99)
POTASSIUM SERPL-SCNC: 3.4 MMOL/L (ref 3.4–5.3)
SODIUM SERPL-SCNC: 140 MMOL/L (ref 133–144)

## 2020-12-15 PROCEDURE — 36415 COLL VENOUS BLD VENIPUNCTURE: CPT | Performed by: FAMILY MEDICINE

## 2020-12-15 PROCEDURE — 99204 OFFICE O/P NEW MOD 45 MIN: CPT | Performed by: FAMILY MEDICINE

## 2020-12-15 PROCEDURE — 80048 BASIC METABOLIC PNL TOTAL CA: CPT | Performed by: FAMILY MEDICINE

## 2020-12-15 RX ORDER — AMOXICILLIN 500 MG/1
CAPSULE ORAL
COMMUNITY
Start: 2020-10-13 | End: 2022-08-30

## 2020-12-15 RX ORDER — LORATADINE 10 MG/1
10 TABLET ORAL
COMMUNITY
End: 2022-08-30

## 2020-12-15 RX ORDER — PRAMIPEXOLE DIHYDROCHLORIDE 0.12 MG/1
0.12 TABLET ORAL AT BEDTIME
COMMUNITY
Start: 2020-11-04 | End: 2021-08-02

## 2020-12-15 RX ORDER — ALBUTEROL SULFATE 90 UG/1
AEROSOL, METERED RESPIRATORY (INHALATION)
COMMUNITY
Start: 2020-04-14

## 2020-12-15 RX ORDER — ATENOLOL 50 MG/1
50 TABLET ORAL DAILY
Qty: 90 TABLET | Refills: 0 | COMMUNITY
Start: 2020-12-15 | End: 2021-03-16

## 2020-12-15 RX ORDER — HYDROCHLOROTHIAZIDE 25 MG/1
25 TABLET ORAL DAILY
COMMUNITY
Start: 2020-12-03 | End: 2022-01-31

## 2020-12-15 ASSESSMENT — MIFFLIN-ST. JEOR: SCORE: 1308.64

## 2020-12-15 NOTE — LETTER
December 15, 2020      Kelly Campbell  [address] [facility] [id number]        Dear ,    We are writing to inform you of your test results.    {results letter list:402272}    Resulted Orders   Basic metabolic panel  (Ca, Cl, CO2, Creat, Gluc, K, Na, BUN)   Result Value Ref Range    Sodium 140 133 - 144 mmol/L    Potassium 3.4 3.4 - 5.3 mmol/L    Chloride 103 94 - 109 mmol/L    Carbon Dioxide 31 20 - 32 mmol/L    Anion Gap 6 3 - 14 mmol/L    Glucose 102 (H) 70 - 99 mg/dL    Urea Nitrogen 23 7 - 30 mg/dL    Creatinine 0.86 0.52 - 1.04 mg/dL    GFR Estimate 67 >60 mL/min/[1.73_m2]      Comment:      Non  GFR Calc  Starting 12/18/2018, serum creatinine based estimated GFR (eGFR) will be   calculated using the Chronic Kidney Disease Epidemiology Collaboration   (CKD-EPI) equation.      GFR Estimate If Black 78 >60 mL/min/[1.73_m2]      Comment:       GFR Calc  Starting 12/18/2018, serum creatinine based estimated GFR (eGFR) will be   calculated using the Chronic Kidney Disease Epidemiology Collaboration   (CKD-EPI) equation.      Calcium 9.9 8.5 - 10.1 mg/dL     It was great to see you today.  Kidneys, electrolytes and blood sugar are normal.    If you have any questions or concerns, please call the clinic at the number listed above.       Sincerely,      Mimi Mcclure MD

## 2020-12-15 NOTE — PROGRESS NOTES
Assessment/Plan:    Kelly Campbell is a 70 year old female presenting for:    1. Benign essential hypertension  She will continue the hydrochlorothiazide.  She was on atenolol previously and will start taking it once again.  She will take 50 mg of atenolol daily as well as 25 mg of hydrochlorothiazide.  Recheck BMP today.  She will contact me in 7 to 10 days to let me know how she is doing.  - atenolol (TENORMIN) 50 MG tablet; Take 1 tablet (50 mg) by mouth daily  Dispense: 90 tablet; Refill: 0  - Basic metabolic panel  (Ca, Cl, CO2, Creat, Gluc, K, Na, BUN)        There are no discontinued medications.        Chief Complaint:  Hypertension and Labs Only        Subjective:   Kelly Campbell is a very pleasant 70-year-old female presenting to the clinic today for follow-up for blood pressure.  I had previously seen her in clinic and her blood pressure was a bit elevated.  We increased her atenolol from 50 mg daily to 100 mg daily.  She did not notice much difference with her blood pressures.  At that point I sent hydrochlorothiazide to the pharmacy.  There was miscommunication and she stopped taking her atenolol and started taking hydrochlorothiazide.  Blood pressures have still been elevated in the 140s over 90s.  Sometimes they are in the 130s over 80s and very occasionally in the 150 systolic.  She states that she does have some pounding headaches sometimes.  She does have some muscle tension sometimes as well    Unfortunately, her  passed away a few months ago which has understandably been very difficult for the patient.  She feels as though she has a good support system.    12 point review of systems completed and negative except for what has been described above    History   Smoking Status     Never Smoker   Smokeless Tobacco     Never Used         Current Outpatient Medications:      acetaminophen-caffeine (EXCEDRIN TENSION HEADACHE) 500-65 MG TABS, Take 1-2 tablets by mouth, Disp: , Rfl:       "albuterol (PROAIR HFA/PROVENTIL HFA/VENTOLIN HFA) 108 (90 Base) MCG/ACT inhaler, INHALE 2 PUFFS EVERY 4 HOURS AS NEEDED FOR SHORTNESS OF BREATH OR WHEEZING., Disp: , Rfl:      amoxicillin (AMOXIL) 500 MG capsule, TAKE 4 CAPSULES BY MOUTH ONE HOUR BEFORE APPOINTMENT, Disp: , Rfl:      atenolol (TENORMIN) 50 MG tablet, Take 1 tablet (50 mg) by mouth daily, Disp: 90 tablet, Rfl: 0     calcium carbonate-vitamin D (OYSTER SHELL CALCIUM/D) 500-200 MG-UNIT tablet, Take 1 tablet by mouth, Disp: , Rfl:      hydrochlorothiazide (HYDRODIURIL) 25 MG tablet, Take 25 mg by mouth daily, Disp: , Rfl:      loratadine (CLARITIN) 10 MG tablet, Take 10 mg by mouth, Disp: , Rfl:      omeprazole (PRILOSEC) 20 MG DR capsule, Take 20 mg by mouth, Disp: , Rfl:      pramipexole (MIRAPEX) 0.125 MG tablet, Take 0.125 mg by mouth At Bedtime, Disp: , Rfl:       Objective:  Vitals:    12/15/20 1121 12/15/20 1140   BP: (!) 162/100 138/84   Pulse: 84    Resp: 16    Temp: 97.1  F (36.2  C)    TempSrc: Tympanic    Weight: 84.1 kg (185 lb 8 oz)    Height: 1.581 m (5' 2.25\")        Body mass index is 33.66 kg/m .    Vital signs reviewed and stable  General: No acute distress  Psych: Appropriate affect  HEENT: moist mucous membranes, pupils equal, round, reactive to light and accomodation, tympanic membranes are pearly grey bilaterally  Lymph: no cervical or supraclavicular lymphadenopathy  Cardiovascular: regular rate and rhythm with no murmur  Pulmonary: clear to auscultation bilaterally with no wheeze  Abdomen: soft, non tender, non distended with normo-active bowel sounds  Extremities: warm and well perfused with no edema  Skin: warm and dry with no rash         This note has been dictated and transcribed using voice recognition software.   Any errors in transcription are unintentional and inherent to the software.  "

## 2020-12-26 ENCOUNTER — COMMUNICATION - HEALTHEAST (OUTPATIENT)
Dept: FAMILY MEDICINE | Facility: CLINIC | Age: 70
End: 2020-12-26

## 2020-12-26 DIAGNOSIS — I10 BENIGN ESSENTIAL HYPERTENSION: ICD-10-CM

## 2020-12-29 ENCOUNTER — MYC MEDICAL ADVICE (OUTPATIENT)
Dept: FAMILY MEDICINE | Facility: CLINIC | Age: 70
End: 2020-12-29

## 2021-01-15 ENCOUNTER — HEALTH MAINTENANCE LETTER (OUTPATIENT)
Age: 71
End: 2021-01-15

## 2021-03-16 ENCOUNTER — MYC REFILL (OUTPATIENT)
Dept: FAMILY MEDICINE | Facility: CLINIC | Age: 71
End: 2021-03-16

## 2021-03-16 DIAGNOSIS — I10 BENIGN ESSENTIAL HYPERTENSION: ICD-10-CM

## 2021-03-17 RX ORDER — ATENOLOL 50 MG/1
50 TABLET ORAL DAILY
Qty: 90 TABLET | Refills: 2 | Status: SHIPPED | OUTPATIENT
Start: 2021-03-17 | End: 2021-11-11

## 2021-05-26 ENCOUNTER — RECORDS - HEALTHEAST (OUTPATIENT)
Dept: ADMINISTRATIVE | Facility: CLINIC | Age: 71
End: 2021-05-26

## 2021-05-27 NOTE — ANESTHESIA PROCEDURE NOTES
Spinal Block    Patient location during procedure: OR  Start time: 3/27/2019 12:33 PM  End time: 3/27/2019 12:37 PM  Reason for block: primary anesthetic    Staffing:  Performing  Anesthesiologist: Jose L Schmidt MD    Preanesthetic Checklist  Completed: patient identified, risks, benefits, and alternatives discussed, timeout performed, consent obtained, airway assessed, oxygen available, suction available, emergency drugs available and hand hygiene performed  Spinal Block  Patient position: sitting  Prep: ChloraPrep  Patient monitoring: heart rate, cardiac monitor, continuous pulse ox and blood pressure  Approach: midline  Location: L3-4  Injection technique: single-shot  Needle type: pencil-tip   Needle gauge: 24 G      Additional Notes:  Negative paresthesia or heme.    Clear CSF on first attempt.

## 2021-05-27 NOTE — ANESTHESIA POSTPROCEDURE EVALUATION
Patient: Kelly Campbell  LEFT TOTAL KNEE ARTHROPLASTY COMPUTER ASSIST  Anesthesia type: spinal    Patient location: PACU  Last vitals:   Vitals:    03/27/19 1540   BP: 130/77   Pulse: 77   Resp: 18   Temp: 36.9  C (98.4  F)   SpO2: 96%     Post vital signs: stable  Level of consciousness: awake and responds to simple questions  Post-anesthesia pain: pain controlled  Post-anesthesia nausea and vomiting: no  Pulmonary: unassisted, return to baseline  Cardiovascular: stable and blood pressure at baseline  Hydration: adequate  Anesthetic events: no    QCDR Measures:  ASA# 11 - Gracie-op Cardiac Arrest: ASA11B - Patient did NOT experience unanticipated cardiac arrest  ASA# 12 - Gracie-op Mortality Rate: ASA12B - Patient did NOT die  ASA# 13 - PACU Re-Intubation Rate: NA - No ETT / LMA used for case  ASA# 10 - Composite Anes Safety: ASA10A - No serious adverse event    Additional Notes:

## 2021-05-27 NOTE — TELEPHONE ENCOUNTER
Informed patient of providers message below. Patient is not currently having any symptoms. Pt states when she was in the hospital they told her it was a UTI. Patient was just afraid since she has a total knee that she does not want any type of infection that could turn into one big infection. Patient will come in if she starts to have any symptoms, but currently she is not having any UTI symptoms or fevers.

## 2021-05-27 NOTE — ANESTHESIA PREPROCEDURE EVALUATION
Anesthesia Evaluation      Patient summary reviewed   No history of anesthetic complications     Airway   Mallampati: II  Neck ROM: full   Pulmonary - negative ROS and normal exam                          Cardiovascular - normal exam  (+) hypertension well controlled, ,   Received beta blockers in last 24 hours prior to incision.  ,   Neuro/Psych - negative ROS     Endo/Other - negative ROS      GI/Hepatic/Renal - negative ROS           Dental - normal exam                        Anesthesia Plan  Planned anesthetic: spinal and peripheral nerve block    ASA 2     Anesthetic plan and risks discussed with: patient    Post-op plan: routine recovery      Plan spinal anesthetic with preoperative placement of single shot adductor canal block for postoperative pain control per surgeon request.

## 2021-05-27 NOTE — ANESTHESIA CARE TRANSFER NOTE
Last vitals:   Vitals:    03/27/19 1440   BP: 101/58   Pulse: 76   Resp: 16   Temp:    SpO2: 97%   T=98.1 temporal  Patient's level of consciousness is drowsy  Spontaneous respirations: yes  Maintains airway independently: yes  Dentition unchanged: yes  Oropharynx: oropharynx clear of all foreign objects    QCDR Measures:  ASA# 20 - Surgical Safety Checklist: WHO surgical safety checklist completed prior to induction    PQRS# 430 - Adult PONV Prevention: 4558F - Pt received => 2 anti-emetic agents (different classes) preop & intraop  ASA# 8 - Peds PONV Prevention: NA - Not pediatric patient, not GA or 2 or more risk factors NOT present  PQRS# 424 - Gracie-op Temp Management: 4559F - At least one body temp DOCUMENTED => 35.5C or 95.9F within required timeframe  PQRS# 426 - PACU Transfer Protocol: - Transfer of care checklist used  ASA# 14 - Acute Post-op Pain: ASA14B - Patient did NOT experience pain >= 7 out of 10

## 2021-05-27 NOTE — TELEPHONE ENCOUNTER
Kelly Salvador was wondering if it would be possible for her to have a lab appt for recheck of urinalysis next week in the clinic. She has completed her course of antibiotic but wants confirmation that UTI has resolved. She will be starting OP PT at [facility] on Tues (4/23) in same building as your clinic.  Does she need an appointment or order for a lab to be drawn and if so, would you be able to set this up for her?  If this is not possible, please let me know and we could have one of our home care nurses do a 1x visit to take urine sample before I discharge on Thurs 4/18.  We thought best option would be for her to do it in the clinic next week if possible though.  Thanks,  Senait Hunter, PT

## 2021-05-27 NOTE — TELEPHONE ENCOUNTER
3/39/19 urine culture was NEGATIVE so not really a uti (initial urinalysis looked like it was possible).  If still having symptoms, ok to do a follow up urine, but no need otherwise.

## 2021-05-28 ENCOUNTER — RECORDS - HEALTHEAST (OUTPATIENT)
Dept: ADMINISTRATIVE | Facility: CLINIC | Age: 71
End: 2021-05-28

## 2021-05-28 NOTE — PROGRESS NOTES
BP not at goal----due to fact pt had surgery/ could be pain issues, recheck 1-2 weeks.  Readjust meds if >140/90 on f/u check

## 2021-05-28 NOTE — PROGRESS NOTES
Chief Complaint   Patient presents with     poss BP High     x1day Upper arm pain on the left arm        ASSESSMENT & PLAN:   Diagnoses and all orders for this visit:    Pain in joint, upper arm, left        MDM:  Patient's main concern was that this was a anginal equivalent.  Patient has no associated symptoms, arm swelling, rash, apparent rotator cuff injury, no history of chest pain.  Pain is isolated to the bicep area and is a little tender in the indicated area of pain.  Seems to be most likely a muscle strain.  Recommended  returning or going to emergency room for any noted swelling in the arm given her recent knee surgery, persistent pain especially with nausea, weakness, dizziness or chest pain.  Patient says she feels reassured and will monitor for additional symptoms.  Patient will follow-up with primary care provider for persistent high blood pressure since surgery in March, despite improvement in pain.    Supportive care discussed.  See discharge instructions below for specific recommendations given.    At the end of the encounter, I discussed results, diagnosis, medications. Discussed red flags for immediate return to clinic/ER, as well as indications for follow up if no improvement. Patient and/or caregiver understood and agreed to plan. Patient was stable for discharge.    SUBJECTIVE    HPI:  Fleeting left bicep area arm pain starting last night.  Had been going on off for a few seconds at a time and on x 2 days.  On one occasion, was quite severe. Not currently present.      No chest pain, nausea, shortness of breath.      BP is high today -  took it last night.  Has been high since March with left knee replacement.  Knee pain is 3/10.      See ROS for additional symptoms and/or pertinent negatives.           History obtained from the patient.    Past Medical History:   Diagnosis Date     Arthritis      Hypertension      Osteopenia        Active Ambulatory (Non-Hospital) Problems    Diagnosis      Acute cystitis without hematuria     Anemia due to blood loss, acute     Knee pain, left     Status post total left knee replacement     Hypercholesteremia     Stress incontinence of urine     Shoulder impingement, right     Obesity (BMI 30-39.9)     Benign essential HTN     Motion Sickness     Localized Osteoarthritis Of The Knee     Colitis     Osteopenia     Rhinitis         Social History     Tobacco Use     Smoking status: Never Smoker     Smokeless tobacco: Never Used   Substance Use Topics     Alcohol use: No     Frequency: Never     Comment: 2-3 drinks a year       Review of Systems   Constitutional: Negative for chills and fever.   Respiratory: Negative for cough and shortness of breath.    Cardiovascular: Positive for leg swelling (left side / surgical / from knee down ). Negative for chest pain.   Musculoskeletal: Positive for arthralgias (rt shoulder - bursitis ).   Skin: Negative for rash.   Neurological: Negative for dizziness, weakness, light-headedness and numbness.       OBJECTIVE    Vitals:    04/30/19 1204 04/30/19 1207   BP: 144/85 (!) 161/94   Patient Site: Right Arm Left Arm   Patient Position: Sitting Sitting   Cuff Size:  Adult Large   Pulse: 75    Resp: 19    Temp: 97.6  F (36.4  C)    TempSrc: Oral    SpO2: 100%    Weight: 190 lb (86.2 kg)        Physical Exam   Constitutional: She is oriented to person, place, and time. She appears well-developed and well-nourished. No distress.   HENT:   Right Ear: External ear normal.   Left Ear: External ear normal.   Eyes: Conjunctivae are normal. Right eye exhibits no discharge. Left eye exhibits no discharge.   Cardiovascular: Normal rate, regular rhythm, normal heart sounds and intact distal pulses.   Pulmonary/Chest: Effort normal.   Musculoskeletal: Normal range of motion. She exhibits edema (No edema and indicated area of left upper extremity pain.  1+ pitting edema below the level of the left knee anteriorly posteriorly-recently operated  on.) and tenderness (Slight tenderness to upper medial aspect of bicep). She exhibits no deformity.   Negative drop arm test.  Normal left upper extremity range of motion.   Neurological: She is alert and oriented to person, place, and time.   Skin: Skin is warm and dry. Capillary refill takes less than 2 seconds. Rash noted.   No signs of infection on post operative left knee.   Psychiatric: She has a normal mood and affect. Her behavior is normal. Judgment and thought content normal.       Labs:  No results found for this or any previous visit (from the past 240 hour(s)).      Radiology:    Xr Knee Left 1 Or 2 Vws Portable    Result Date: 3/27/2019  EXAM: XR KNEE LEFT 1 OR 2 VWS PORTABLE LOCATION: [facility] DATE/TIME: 3/27/2019 3:40 PM INDICATION: S/P left TKA. COMPARISON: None. FINDINGS: Patient's undergone a left total knee arthroplasty which is in anatomic alignment. No fracture. Postoperative changes with soft tissue air.      PATIENT INSTRUCTIONS:   Patient Instructions   Ask PT about your arm today.  Concerning signs - arm swelling, redness, rash, pain that is consistent.        Patient Education

## 2021-05-28 NOTE — TELEPHONE ENCOUNTER
----- Message from Merari Wesley MD sent at 4/25/2019  9:04 PM CDT -----    BP not at goal----due to fact pt had surgery/ could be pain issues, recheck 1-2 weeks.  Readjust meds if >140/90 on f/u check    ----- Message -----  From: Jacinda Goodwin CMA  Sent: 4/25/2019  11:01 AM  To: Merari Wesley MD

## 2021-05-28 NOTE — PROGRESS NOTES
Assessment and Plan:       1. Routine general medical examination at a health care facility  Immunizations reviewed and utd--patient will look into insurance coverage for shingles vaccine  Colonosocopy reviewed --7/10, repeat 2020  Mammography reviewed last done 3/17, referral completed for routine screening.  Pap reviewed  Routine Dental and Eye care recommended  Discussed importance of regular exercise and appropriate calcium intake  Discussed Advance Directives--copy on chart.  ABCs of skin cancer reviewed with patient--encouraged her to use sunscreen regularly.  Follow-up with any concerns or changing skin lesions.  Discussed diagnosis of seborrheic keratosis on right hip area and patient can follow-up for removal at her discretion.    - Mammo Screening Bilateral; Future    2. Benign essential HTN  Blood pressure normal here today.  Continue with atenolol this is renewed at her request.  Labs done during hospitalization and up-to-date.  - atenolol (TENORMIN) 50 MG tablet; Take 1 tablet (50 mg total) by mouth daily.  Dispense: 90 tablet; Refill: 3    3. Status post total left knee replacement  Left total knee 3/19.  Patient to complete physical therapy and continue with home exercises.  Routine follow-up with orthopedics.    4. Osteopenia  DEXA reviewed from 2017.  Patient opts to defer recheck until 2020.  Recommend calcium intake (12-1500mg/day) with vitamin D (800-1000 IU daily) as well as weight bearing exercise to include strength and balance.  Calcium supplement should be calcium CITRATE    5. Obesity (BMI 30-39.9)  BMI consistent with obesity and this is reviewed with patient.  Strongly encouraged her to's increase activity as tolerated status post total knee arthroplasty.    6. Hypercholesteremia  Cholesterol labs up-to-date--repeat 9/19.  Continue with fenofibrate.    7. Anemia due to blood loss, acute  Hemoglobin down to 9.2 postoperatively.  Follow-up hemoglobin today--patient will be advised of  results when available  - HM2(CBC w/o Differential)    8. Restless leg syndrome  Patient provided with information regarding restless leg and we discussed management.  She defers any trial of medication at this point but will follow-up if symptoms become more persistent.  We will check ferritin in addition to the above CBC to rule out iron deficiency which can be a cause for her RLS.  - Ferritin    9. Atrophic vaginitis  Monitor and treat with any changes or concerns.        The patient's current medical problems were reviewed.    I have had an Advance Directives discussion with the patient.  The following high BMI interventions were performed this visit: encouragement to exercise and weight monitoring  The following health maintenance schedule was reviewed with the patient and provided in printed form in the after visit summary:   Health Maintenance   Topic Date Due     ZOSTER VACCINES (2 of 3) 09/09/2009     MAMMOGRAM  03/29/2019     DXA SCAN  03/29/2019     FALL RISK ASSESSMENT  01/07/2020     COLONOSCOPY  07/01/2020     TD 18+ HE  06/08/2021     ADVANCE DIRECTIVES DISCUSSED WITH PATIENT  03/27/2024     PNEUMOCOCCAL POLYSACCHARIDE VACCINE AGE 65 AND OVER  Completed     INFLUENZA VACCINE RULE BASED  Completed     PNEUMOCOCCAL CONJUGATE VACCINE FOR ADULTS (PCV13 OR PREVNAR)  Completed        Subjective:   Chief Complaint: Kelly Campbell is an 68 y.o. female here for an Annual Wellness visit.   HPI: Kelly's past medical history significant for elevated cholesterol, hypertension.  She is status post left total knee arthroplasty 3/19.  Patient had some postoperative anemia but reports feeling well--denying lightheaded/dizziness or excessive fatigue.  She has been actively participating with physical therapy and reports doing well.  She thinks her recovery from this surgery is going better than her right knee in part due to knowing what to expect.  Patient reports compliance with current medication no side effects or  concerns.  She would like a refill of her atenolol.  Kelly has a lesion on her right waist which she would like to have removed.  Its frequently getting caught on underwear and occasionally irritated.  She denies that it has grown or changed significantly.  Patient reports reasonably good bladder control, no issues with bowels.  She and her  are infrequently sexually active in part due to her 's history of prostate disease/treatment.  She does have significant dryness.  She denies any chronic vaginal discharge, irritation, soreness or other concerns.    Review of Systems: Please see above.  The rest of the review of systems are negative for all systems.    Patient Care Team:  Merari Wesley MD as PCP - Castillo Paredes MD as Physician (Orthopedic Surgery)     Patient Active Problem List   Diagnosis     Benign essential HTN     Motion Sickness     Localized Osteoarthritis Of The Knee     Colitis     Osteopenia     Rhinitis     Obesity (BMI 30-39.9)     Hypercholesteremia     Stress incontinence of urine     Shoulder impingement, right     Knee pain, left     Status post total left knee replacement     Anemia due to blood loss, acute     Acute cystitis without hematuria     Past Medical History:   Diagnosis Date     Arthritis      Hypertension      Osteopenia       Past Surgical History:   Procedure Laterality Date     BILATERAL SALPINGOOPHORECTOMY  03/2018    [facility]; Dr Samuel   path=benign R ovarian fibroma     INCISION AND DRAINAGE OF WOUND Right 5/17/2016    Procedure: INCISION DRAINAGE AND  DEBRIDEMENT OF RIGHT KNEE AND RIGHT TIBIAL INSERT REVISION;  Surgeon: Castillo Blas MD;  Location: [facility];  Service:      AR APPENDECTOMY      Description: Appendectomy;  Recorded: 07/15/2009;     AR LIGATE FALLOPIAN TUBE      Description: Tubal Ligation;  Recorded: 07/15/2009;     AR REVISE KNEE JOINT REPLACE,ALL PARTS Right 5/17/2016    Procedure: RIGHT TIBIAL INSERT REVISION;  Surgeon:  Castillo Blas MD;  Location: [facility] Main OR;  Service: Orthopedics     VA TOTAL KNEE ARTHROPLASTY      Description: Total Knee Arthroplasty;  Recorded: 04/20/2012;  Comments: 4/12---some post op anemia; Performed by Dr. Gresham with [facility]     REPLACEMENT TOTAL KNEE Left 3/27/2019    Procedure: LEFT TOTAL KNEE ARTHROPLASTY COMPUTER ASSIST;  Surgeon: Castillo Blas MD;  Location: [facility] Main OR;  Service: Orthopedics     TOTAL KNEE ARTHROPLASTY Left 03/27/2019    [facility]--Biebl      Family History   Problem Relation Age of Onset     Heart disease Mother      Hypertension Mother      Stroke Mother      Asthma Mother      Cancer Father       Social History     Socioeconomic History     Marital status:      Spouse name: Not on file     Number of children: Not on file     Years of education: Not on file     Highest education level: Not on file   Occupational History     Not on file   Social Needs     Financial resource strain: Not on file     Food insecurity:     Worry: Not on file     Inability: Not on file     Transportation needs:     Medical: Not on file     Non-medical: Not on file   Tobacco Use     Smoking status: Never Smoker     Smokeless tobacco: Never Used   Substance and Sexual Activity     Alcohol use: No     Frequency: Never     Comment: 2-3 drinks a year     Drug use: No     Sexual activity: Not on file   Lifestyle     Physical activity:     Days per week: Not on file     Minutes per session: Not on file     Stress: Not on file   Relationships     Social connections:     Talks on phone: Not on file     Gets together: Not on file     Attends Restorationist service: Not on file     Active member of club or organization: Not on file     Attends meetings of clubs or organizations: Not on file     Relationship status: Not on file     Intimate partner violence:     Fear of current or ex partner: Not on file     Emotionally abused: Not on file     Physically abused: Not on file      "Forced sexual activity: Not on file   Other Topics Concern     Not on file   Social History Narrative    05/16/16 - Patient resides with her .       Current Outpatient Medications   Medication Sig Dispense Refill     acetaminophen-caffeine (EXCEDRIN) 500-65 mg Tab per tablet Take 1-2 tablets by mouth daily as needed.              atenolol (TENORMIN) 50 MG tablet Take 1 tablet (50 mg total) by mouth daily. 90 tablet 0     calcium-vitamin D 500 mg(1,250mg) -200 unit per tablet Take 1 tablet by mouth daily.       FA/mv,Ca,iron,min/lycopene/lut (MULTIVITAL ORAL) Take 1 tablet by mouth daily.       fenofibrate (TRICOR) 54 MG tablet Take 1 tablet (54 mg total) by mouth daily. 90 tablet 3     ibuprofen (ADVIL,MOTRIN) 600 MG tablet Take 600 mg by mouth every 6 (six) hours as needed for pain.       loratadine (CLARITIN) 10 mg tablet Take 10 mg by mouth daily.       omeprazole (PRILOSEC) 20 MG capsule Take 20 mg by mouth daily.       No current facility-administered medications for this visit.       Objective:   Vital Signs:   Visit Vitals  /76 (Patient Site: Left Arm, Patient Position: Sitting, Cuff Size: Adult Large)   Pulse 80   Temp 97.1  F (36.2  C) (Oral)   Resp 16   Ht 5' 2.32\" (1.583 m)   Wt 188 lb (85.3 kg)   BMI 34.03 kg/m         VisionScreening:  No exam data present     PHYSICAL EXAM      General Appearance:    Alert, cooperative, no distress, appears stated age   Head:    Normocephalic, without obvious abnormality, atraumatic   Eyes:    PERRL, conjunctiva/corneas clear, EOM's intact both eyes   Ears:    Normal TM's and external ear canals, both ears   Throat:   Lips, mucosa, and tongue normal; teeth and gums normal   Neck:   Supple, symmetrical, trachea midline, no adenopathy;     thyroid:  no enlargement/tenderness/nodules; no carotid    bruit or JVD   Back:     Symmetric, no curvature, no CVA tenderness   Lungs:     Clear to auscultation bilaterally, respirations unlabored   Chest Wall:    No " tenderness or deformity    Heart:    Regular rate and rhythm, S1 and S2 normal, no murmur, rub   or gallop   Breast Exam:    No tenderness, masses, or nipple abnormality   Abdomen:     Soft, non-tender, bowel sounds active all four quadrants,     no masses, no organomegaly   Genitalia:   Significant vaginal atrophic changes with loss of inner labia, thinning skin.  No areas of irritation or pigment change.  No discharge at the introitus.   Rectal:   Normal external rectal tissue.   Extremities:   Extremities normal, atraumatic, no cyanosis or edema   Pulses:   2+ and symmetric all extremities   Skin:   Skin color, texture, turgor normal, no rashes or lesions; larger (1 cm max diameter), raised seborrheic keratosis noted right lower hip at underwear area.  Does not appear atypical--no current  irritation or bleeding.   Neurologic:   CNII-XII intact             Assessment Results 5/7/2019   Activities of Daily Living No help needed   Instrumental Activities of Daily Living No help needed   Mini Cog Total Score 5   Some recent data might be hidden     A Mini-Cog score of 0-2 suggests the possibility of dementia, score of 3-5 suggests no dementia    Identified Health Risks:     The patient was counseled and encouraged to consider modifying their diet and eating habits. She was provided with information on recommended healthy diet options.  Information on urinary incontinence and treatment options given to patient.  Patient's advanced directive was discussed and I am comfortable with the patient's wishes.

## 2021-05-28 NOTE — TELEPHONE ENCOUNTER
Pt states she is completely out of her medication and has not taken it today due to not having any. Pt does have a physical schedule on 5/7/2019

## 2021-05-28 NOTE — PATIENT INSTRUCTIONS - HE
Ask PT about your arm today.  Concerning signs - arm swelling, redness, rash, pain that is consistent.

## 2021-05-28 NOTE — PROGRESS NOTES
I met with Kelly Campbell at the request of Dr. Wesley to recheck her blood pressure.  Blood pressure medications on the MAR were reviewed with patient.    Patient has taken all medications as per usual regimen: Yes  Patient reports tolerating them without any issues or concerns: Yes    Vitals:    04/25/19 1059   BP: 145/81   Patient Site: Left Arm   Patient Position: Sitting   Cuff Size: Adult Large   Pulse: 89   Resp: 16       Blood pressure was taken, previous encounter was reviewed, recorded blood pressure below 140/90.  Patient was discharged and the note will be sent to the provider for final review.

## 2021-05-28 NOTE — TELEPHONE ENCOUNTER
Reason contacted:  BP   Information relayed:  See msg below. Pt would like to recheck BP at her upcoming appt in 2 wk  Additional questions:  No  Further follow-up needed:  No

## 2021-05-30 VITALS — HEIGHT: 64 IN | WEIGHT: 188.06 LBS | BODY MASS INDEX: 32.11 KG/M2

## 2021-05-30 VITALS — BODY MASS INDEX: 31.6 KG/M2 | WEIGHT: 184.13 LBS

## 2021-05-30 VITALS — WEIGHT: 185.6 LBS | BODY MASS INDEX: 31.86 KG/M2

## 2021-05-31 VITALS — WEIGHT: 188 LBS | BODY MASS INDEX: 32.27 KG/M2

## 2021-05-31 VITALS — HEIGHT: 64 IN | WEIGHT: 187.44 LBS | BODY MASS INDEX: 32 KG/M2

## 2021-05-31 VITALS — WEIGHT: 185.13 LBS | HEIGHT: 64 IN | BODY MASS INDEX: 31.6 KG/M2

## 2021-06-02 VITALS — WEIGHT: 188 LBS | HEIGHT: 62 IN | BODY MASS INDEX: 34.6 KG/M2

## 2021-06-02 VITALS — WEIGHT: 190 LBS | BODY MASS INDEX: 32.61 KG/M2

## 2021-06-02 VITALS — WEIGHT: 190.8 LBS | BODY MASS INDEX: 32.75 KG/M2

## 2021-06-02 VITALS — WEIGHT: 185.44 LBS | BODY MASS INDEX: 31.66 KG/M2 | HEIGHT: 64 IN

## 2021-06-02 VITALS — WEIGHT: 184 LBS | BODY MASS INDEX: 31.58 KG/M2

## 2021-06-03 VITALS
BODY MASS INDEX: 34.67 KG/M2 | HEIGHT: 62 IN | SYSTOLIC BLOOD PRESSURE: 130 MMHG | HEART RATE: 67 BPM | WEIGHT: 188.4 LBS | DIASTOLIC BLOOD PRESSURE: 85 MMHG | OXYGEN SATURATION: 95 %

## 2021-06-03 NOTE — TELEPHONE ENCOUNTER
Refill Request  Did you contact pharmacy: No  Medication name:   Requested Prescriptions     Pending Prescriptions Disp Refills     fenofibrate (TRICOR) 54 MG tablet 90 tablet 3     Sig: Take 1 tablet (54 mg total) by mouth daily.     atenolol (TENORMIN) 50 MG tablet 90 tablet 3     Sig: Take 1 tablet (50 mg total) by mouth daily.     Who prescribed the medication: Lupillo   Pharmacy Name and Location: [facility]  Is patient out of medication: No.  5 days left  Patient notified refills processed in 72 hours:  yes  Okay to leave a detailed message: yes    Leaving town in the morning and needs this medication filled today.  Please call when prescription has been sent  [phone]    Patient was warm transferred to the  to make an established care visit as both her PCP's have left HE.      Jane Lopez RN, BSN [facility] Triage Nurse

## 2021-06-03 NOTE — TELEPHONE ENCOUNTER
Left message for patient stating both of her medications have been refilled and sent to [facility] pharmacy

## 2021-06-04 VITALS
HEART RATE: 68 BPM | TEMPERATURE: 97.8 F | BODY MASS INDEX: 34.48 KG/M2 | HEIGHT: 62 IN | RESPIRATION RATE: 12 BRPM | WEIGHT: 187.38 LBS | DIASTOLIC BLOOD PRESSURE: 87 MMHG | SYSTOLIC BLOOD PRESSURE: 151 MMHG

## 2021-06-04 VITALS
DIASTOLIC BLOOD PRESSURE: 73 MMHG | SYSTOLIC BLOOD PRESSURE: 126 MMHG | BODY MASS INDEX: 34.33 KG/M2 | RESPIRATION RATE: 12 BRPM | TEMPERATURE: 96.6 F | HEART RATE: 67 BPM | WEIGHT: 189.2 LBS

## 2021-06-04 NOTE — PATIENT INSTRUCTIONS - HE
Stay off fenofibrate for one month and then update me.    High dose flu vaccine today    Fasting labs    Schedule a bone density    You may get Shingrix at [facility] pharmacy    Colonoscopy is due in July 2020.  Pt will get back to me about this after further consideration.

## 2021-06-04 NOTE — PROGRESS NOTES
DIAGNOSIS:  1. Hypercholesteremia                          The diagnosis was confirmed.  The condition is stable/controlled on FENOFIBRATEand no side effects  have been noted.  The appropriate follow up labs  ( FLP, CMP )have been ordered  (OR ARE UP TO DATE) and medication refills ordered if requested.    ONE WEEK HOLIDAY FROM FENOFIBRATE AND THEN RECHECK FLP OFF FENOFIBRATE.  THEN FOLLOW UP APPT.   Lipid [place] FASTING    Comprehensive Metabolic Panel    Lipid Profile   2. Benign essential HTN                          The diagnosis was confirmed.  The condition is stable/controlled on ATENOLOL and no side effects  have been noted.  The appropriate follow up labs  (CMP* )have been ordered  (OR ARE UP TO DATE) and medication refills ordered if requested.      3. Gastroesophageal reflux disease without esophagitis                          The diagnosis was confirmed.  The condition is stable/controlled on OMEPRAZOLE and no side effects  have been noted.  The appropriate follow up labs  ( NA )have been ordered  (OR ARE UP TO DATE) and medication refills ordered if requested.      4. Flu vaccine need  Influenza High Dose, Seasonal 65+ yrs   5. Post-menopausal  DXA Bone Density Scan       PLAN:    Stay off fenofibrate for one month and then update me.    High dose flu vaccine today    Fasting labs    Schedule a bone density    You may get Shingrix at [facility] pharmacy    Colonoscopy is due in July 2020.  Pt will get back to me about this after further consideration.       I spent 30 MIN with patient face-to-face, of which 50% or greater was spent in counseling and coordination of care in regards to patient's HEALTH MAINT, CHOLESTEROL ISSUES, MUSCLE ACHES, REVIEW OF MEDS..   Please see plan above           HPI: [id number] estab care.  Having muscle aches in the arms (< 1 year)   and the back (3 months)   All the statins caused headaches.   GERD sxs come back immediately if she stops omeprazole   Hasn't taken atenolol yet this  "morning.     Mammogram up to date.  DEXA about 3 1/2 years ago.    Large right ovarian fibroma removed March 2020    colonscopy done in July 2010        Current Outpatient Medications on File Prior to Visit   Medication Sig Dispense Refill     acetaminophen-caffeine (EXCEDRIN) 500-65 mg Tab per tablet Take 1-2 tablets by mouth daily as needed.              atenolol (TENORMIN) 50 MG tablet Take 1 tablet (50 mg total) by mouth daily. 90 tablet 0     calcium-vitamin D 500 mg(1,250mg) -200 unit per tablet Take 1 tablet by mouth daily.       FA/mv,Ca,iron,min/lycopene/lut (MULTIVITAL ORAL) Take 1 tablet by mouth daily.       fenofibrate (TRICOR) 54 MG tablet Take 1 tablet (54 mg total) by mouth daily. 90 tablet 0     ibuprofen (ADVIL,MOTRIN) 600 MG tablet Take 600 mg by mouth every 6 (six) hours as needed for pain.       loratadine (CLARITIN) 10 mg tablet Take 10 mg by mouth daily.       omeprazole (PRILOSEC) 20 MG capsule Take 20 mg by mouth daily.       No current facility-administered medications on file prior to visit.        Pmh: reviewed  Psh: reviewed  Allergy:  reviewed      EXAM:    /85   Pulse 67   Ht 5' 2.25\" (1.581 m)   Wt 188 lb 6.4 oz (85.5 kg)   SpO2 95%   BMI 34.18 kg/m    GEN:   ALERT, NAD, ORIENTED TIMES THREE  NECK: SUPPLE WITHOUT ADENOPATHY OR THYROMEGALY  LUNGS: CTA  COR: RRR WITHOUT MURMUR  SKIN: NO RASH , ULCERS OR LESIONS NOTED  EXT: WITHOUT EDEMA/SWELLING    No results found for this or any previous visit (from the past 168 hour(s)).  Lab Results   Component Value Date    CHOL 196 09/26/2018    CHOL 271 (H) 10/18/2017    CHOL 236 (H) 03/22/2017     Lab Results   Component Value Date    HDL 52 09/26/2018    HDL 40 (L) 10/18/2017    HDL 42 (L) 03/22/2017     Lab Results   Component Value Date    LDLCALC 118 09/26/2018    LDLCALC 178 (H) 10/18/2017    LDLCALC 136 (H) 03/22/2017     Lab Results   Component Value Date    TRIG 129 09/26/2018    TRIG 266 (H) 10/18/2017    TRIG 290 (H) " 03/22/2017     Vitamin D, Total (25-Hydroxy)   Date Value Ref Range Status   03/22/2017 37.0 30.0 - 80.0 ng/mL Final      No components found for: CHOLHDL

## 2021-06-05 NOTE — PATIENT INSTRUCTIONS - HE
Fasting labs, including PTH and Vit D      OSTEOPENIA (LOW BONE MASS)  INSTRUCTIONS:     1.  CALCIUM REQUIREMENT IS 8699-5371 MG DAILY  2. THIS CAN BE ATTAINED THROUGH 4 DAIRY SERVINGS OR AN OTC SUPPLEMENT    3.  VITAMIN D  SHOULD BE TAKEN AT 2000 UNITS DAILY  4.  WEIGHT BEARING EXERCISE LIKE WALKING SHOULD BE DONE DAILY    YOU MAY WANT TO CONSIDER TAKING A MEDICATION SUCH AS ALENDRONATE (FOSAMAX) TO HELP BUILD UP YOUR BONE STRENGTH.  IF YOU WOULD LIKE TO DISCUSS THIS PLEASE SCHEDULE A FOLLOW UP APPOINTMENT.    Repeat a Bone Density evaluation in 2  Years.    Consider ALENDRONATE (FOSAMAX) 35 mg WEEKLY

## 2021-06-05 NOTE — PROGRESS NOTES
DIAGNOSIS:  1. Postmenopausal bone loss , osteopenia Comprehensive Metabolic Panel    Parathyroid Hormone Intact    Vitamin D, Total (25-Hydroxy)   2. Hypercholesteremia , off fenofibrate with no lessening of muscle sxs. Lipid [place] FASTING   3. Special screening for malignant neoplasms, colon , refuses colonoscopy, will to do Cologuard. Cologuard       PLAN:     Fasting labs, including PTH and Vit D     Will re-evaluate treatment of lipids once FLP os back     OSTEOPENIA (LOW BONE MASS)  INSTRUCTIONS:     1.  CALCIUM REQUIREMENT IS 3218-1759 MG DAILY  2. THIS CAN BE ATTAINED THROUGH 4 DAIRY SERVINGS OR AN OTC SUPPLEMENT    3.  VITAMIN D  SHOULD BE TAKEN AT 2000 UNITS DAILY  4.  WEIGHT BEARING EXERCISE LIKE WALKING SHOULD BE DONE DAILY    YOU MAY WANT TO CONSIDER TAKING A MEDICATION SUCH AS ALENDRONATE (FOSAMAX) TO HELP BUILD UP YOUR BONE STRENGTH.  IF YOU WOULD LIKE TO DISCUSS THIS PLEASE SCHEDULE A FOLLOW UP APPOINTMENT.    Repeat a Bone Density evaluation in 2  Years.    Consider ALENDRONATE (FOSAMAX) 35 mg WEEKLY    Cologuard ordered          HPI:  Quit the fenofibrate for a month and it has made to difference.  Would like to recheck fating labs.  Walks but not enough.  Non smoker.  Gets some dairy and is on a calcium supplement (600 mg) daily.  No fractures in the past.        Current Outpatient Medications on File Prior to Visit   Medication Sig Dispense Refill     acetaminophen-caffeine (EXCEDRIN) 500-65 mg Tab per tablet Take 1-2 tablets by mouth daily as needed.              atenolol (TENORMIN) 50 MG tablet Take 1 tablet (50 mg total) by mouth daily. 90 tablet 0     calcium-vitamin D 500 mg(1,250mg) -200 unit per tablet Take 1 tablet by mouth daily.       FA/mv,Ca,iron,min/lycopene/lut (MULTIVITAL ORAL) Take 1 tablet by mouth daily.       ibuprofen (ADVIL,MOTRIN) 600 MG tablet Take 600 mg by mouth every 6 (six) hours as needed for pain.       loratadine (CLARITIN) 10 mg tablet Take 10 mg by mouth once a  week.        omeprazole (PRILOSEC) 20 MG capsule Take 20 mg by mouth daily.       fenofibrate (TRICOR) 54 MG tablet Take 1 tablet (54 mg total) by mouth daily. 90 tablet 0     No current facility-administered medications on file prior to visit.        Pmh: reviewed  Psh: reviewed  Allergy:  reviewed      EXAM:    /73 (Patient Site: Left Arm, Patient Position: Sitting, Cuff Size: Adult Large)   Pulse 67   Temp 96.6  F (35.9  C) (Oral)   Resp 12   Wt 189 lb 3.2 oz (85.8 kg)   BMI 34.33 kg/m    GEN:   ALERT, NAD, ORIENTED TIMES THREE  NECK: SUPPLE WITHOUT ADENOPATHY OR THYROMEGALY  LUNGS: CTA  COR: RRR WITHOUT MURMUR  SKIN: NO RASH , ULCERS OR LESIONS NOTED  EXT: WITHOUT EDEMA/SWELLING    No results found for this or any previous visit (from the past 168 hour(s)).  Vitamin D, Total (25-Hydroxy)   Date Value Ref Range Status   03/22/2017 37.0 30.0 - 80.0 ng/mL Final      Lab Results   Component Value Date    PTH 82 (H) 08/28/2013    CALCIUM 10.1 12/11/2019    PHOS 3.9 03/19/2019     Results for orders placed or performed during the hospital encounter of 03/27/19   Basic Metabolic Panel   Result Value Ref Range    Sodium 139 136 - 145 mmol/L    Potassium 4.1 3.5 - 5.0 mmol/L    Chloride 107 98 - 107 mmol/L    CO2 25 22 - 31 mmol/L    Anion Gap, Calculation 7 5 - 18 mmol/L    Glucose 107 70 - 125 mg/dL    Calcium 8.8 8.5 - 10.5 mg/dL    BUN 23 8 - 28 mg/dL    Creatinine 1.03 0.60 - 1.10 mg/dL    GFR MDRD Af Amer >60 >60 mL/min/1.73m2    GFR MDRD Non Af Amer 53 (L) >60 mL/min/1.73m2     Results for orders placed or performed in visit on 12/11/19   Comprehensive Metabolic Panel   Result Value Ref Range    Sodium 142 136 - 145 mmol/L    Potassium 4.5 3.5 - 5.0 mmol/L    Chloride 107 98 - 107 mmol/L    CO2 25 22 - 31 mmol/L    Anion Gap, Calculation 10 5 - 18 mmol/L    Glucose 93 70 - 125 mg/dL    BUN 22 8 - 28 mg/dL    Creatinine 0.96 0.60 - 1.10 mg/dL    GFR MDRD Af Amer >60 >60 mL/min/1.73m2    GFR MDRD Non Af  Amer 57 (L) >60 mL/min/1.73m2    Bilirubin, Total 0.3 0.0 - 1.0 mg/dL    Calcium 10.1 8.5 - 10.5 mg/dL    Protein, Total 7.1 6.0 - 8.0 g/dL    Albumin 4.0 3.5 - 5.0 g/dL    Alkaline Phosphatase 88 45 - 120 U/L    AST 22 0 - 40 U/L    ALT 18 0 - 45 U/L       Lab Results   Component Value Date    ALT 18 12/11/2019    AST 22 12/11/2019    ALKPHOS 88 12/11/2019    BILITOT 0.3 12/11/2019

## 2021-06-06 NOTE — TELEPHONE ENCOUNTER
----- Message from Thanh Johnson MD sent at 2/21/2020  6:38 PM CST -----  Your COLOGUARD testing from 2-12-20 is NEGATIVE.  It should be repeated in 3 years.

## 2021-06-06 NOTE — TELEPHONE ENCOUNTER
Requested Prescriptions     Pending Prescriptions Disp Refills     atenoloL (TENORMIN) 50 MG tablet 90 tablet 3     Sig: Take 1 tablet (50 mg total) by mouth daily.       Please resend refill to [facility] mail order pharmacy.

## 2021-06-07 NOTE — TELEPHONE ENCOUNTER
Caller states had a scratchy throat on Mar 20, next day progressed to a cough, messaged her PCP on Mar 30 who recommended cough syrup and visit if not improved.     Caller states cough was improving in recent days but now worse.  Was taking Robitussin at night but today cough is so much she took during the day.  Coughing consistently while on phone.  States her home monitoring BP is fine despite cough medicine.    Reports nasal congestion but not really a productive cough.  Denies vomiting, shortness of breath or trouble breathing.    Protocol states to be seen in 24 hours - patient asked about urgent care tonight to see if she could get antibiotics like she's had before for bronchitis.  I explained telemedicine visits due to covid and that a provider could assess if she needs to be seen in person.    Urgent care televisits earliest available is tomorrow morning at 8am.  Went over care advice and direction to go to ED is difficulty breathing, shortness of breath, ect.  Caller in agreement and transferred to HE scheduling.    Salomón Mooney RN  Clinical Trials Nurse

## 2021-06-07 NOTE — PROGRESS NOTES
"Kelly Campbell is a 69 y.o. female who is being evaluated via a billable telephone visit.      The patient has been notified of following:     \"This telephone visit will be conducted via a call between you and your physician/provider. We have found that certain health care needs can be provided without the need for a physical exam.  This service lets us provide the care you need with a short phone conversation.  If a prescription is necessary we can send it directly to your pharmacy.  If lab work is needed we can place an order for that and you can then stop by our lab to have the test done at a later time.    Telephone visits are billed at different rates depending on your insurance coverage. During this emergency period, for some insurers they may be billed the same as an in-person visit.  Please reach out to your insurance provider with any questions.    If during the course of the call the physician/provider feels a telephone visit is not appropriate, you will not be charged for this service.\"    Patient has given verbal consent to a Telephone visit? Yes    Patient would like to receive their AVS by AVS Preference: Etienne.    Kelly Campbell complains of    Chief Complaint   Patient presents with     Cough     Constant productive x 3wks- tried OTC without relief- getting worse- started 2 days after getting back from [place]- first thought allergies     Nasal Congestion     RN Triage       I have reviewed and updated the patient's Past Medical History, Social History, Family History and Medication List.    ALLERGIES  Iodine; Other environmental allergy; Ciprofloxacin; and Hydromorphone    --------------------------------    Assessment/Plan:    Kelly Campbell is a 69 y.o. female who is being evaluated remotely for:    1. Cough  I suspect that this is likely more of a viral cough.  I discussed that with the patient.  She is not really having any fevers but is feeling systemically fatigued.  Tessalon Perles sent " "to the pharmacy which she will try for a few days.  If she begins to get worse or begins to have fever she can start taking the azithromycin.  Discussed the risks and benefits of both medications.  She will contact me if there are further concerns.  - azithromycin (ZITHROMAX Z-SHAWN) 250 MG tablet; Take 2 tablets (500 mg) on  Day 1,  followed by 1 tablet (250 mg) once daily on Days 2 through 5.  Dispense: 6 tablet; Refill: 0  - benzonatate (TESSALON PERLES) 100 MG capsule; Take 1 capsule (100 mg total) by mouth 3 (three) times a day as needed for cough.  Dispense: 30 capsule; Refill: 0        There are no discontinued medications.        Chief Complaint:  Chief Complaint   Patient presents with     Cough     Constant productive x 3wks- tried OTC without relief- getting worse- started 2 days after getting back from [place]- first thought allergies     Nasal Congestion     RN Triage       Subjective:   Kelly Campbell is a 69 y.o. female who is being evaluated via telephone visit today for concerns for a cough.  Patient states that she was in [place] about 3 weeks ago.  She flew home and a few days after getting home she began to have a cough.  She states that initially this was fairly wet and somewhat productive however now in the last few weeks it is fairly dry.  She states that she has a hard time even completing sentences due to the coughing.  She is sleeping okay at night because she is taking some cough medicine before bed.    She has not had any fevers but felt very fatigued.  She states that she had \"bronchitis\" a year ago and was treated with antibiotics and did feel better.    She is able to take a deep breath and does not feel short of breath.  Eating and drinking well.        12 point review of systems completed and negative except for what has been described above    Social History     Tobacco Use   Smoking Status Never Smoker   Smokeless Tobacco Never Used       Current Outpatient Medications   Medication Sig "     acetaminophen-caffeine (EXCEDRIN) 500-65 mg Tab per tablet Take 1-2 tablets by mouth daily as needed.            atenoloL (TENORMIN) 50 MG tablet Take 1 tablet (50 mg total) by mouth daily.     calcium-vitamin D 500 mg(1,250mg) -200 unit per tablet Take 1 tablet by mouth daily.     FA/mv,Ca,iron,min/lycopene/lut (MULTIVITAL ORAL) Take 1 tablet by mouth daily.     ibuprofen (ADVIL,MOTRIN) 600 MG tablet Take 600 mg by mouth every 6 (six) hours as needed for pain.     loratadine (CLARITIN) 10 mg tablet Take 10 mg by mouth once a week.      omeprazole (PRILOSEC) 20 MG capsule Take 20 mg by mouth daily.     azithromycin (ZITHROMAX Z-SHAWN) 250 MG tablet Take 2 tablets (500 mg) on  Day 1,  followed by 1 tablet (250 mg) once daily on Days 2 through 5.     benzonatate (TESSALON PERLES) 100 MG capsule Take 1 capsule (100 mg total) by mouth 3 (three) times a day as needed for cough.         Objective:  No vitals were done due to the remote nature of this visit    General: No acute distress, sounds well  Psych: Appropriate affect, pleasant  Pulm: Patient is coughing quite a bit after every sentence.  Cough sounds to be dry and irritant.     This note has been dictated and transcribed using voice recognition software.   Any errors in transcription are unintentional and inherent to the software.          Phone call duration:  17 minutes    Mimi Mcclure MD

## 2021-06-08 NOTE — PROGRESS NOTES
Assessment & Plan:  1. Essential hypertension  Blood pressure in clinic is very close to normal range today.  Given recent infection and the possible effect that could be having on her blood pressure we will continue to monitor for 2 weeks and have her return for a nurse only blood pressure check.  She is to check her blood pressures at home on a daily basis and bring the log with her as well as her at home cuff to her next appointment for comparison.  Make any medication adjustments needed at that time, no adjustments made today until we get more data.      There are no Patient Instructions on file for this visit.    No orders of the defined types were placed in this encounter.    There are no discontinued medications.        Chief Complaint:   Chief Complaint   Patient presents with     Hypertension     pt is concerned about blood pressure since it has been running high for awhile, has been running in the 160's 140's over 90's       History of Present Illness:  Kelly is a 66 y.o. female presenting to the clinic today for elevated blood pressure.  She has been following with infectious disease for a infection in her right knee prosthetic.  Over the last several visits she has had elevated or borderline elevated blood pressure.  These have been ranging from 130s-160s systolically over 80s-99 diastolic.  He has currently been taking 25 mg of atenolol daily.  She has been taking this for several years without needing dosing adjustments.  She also notes that as of late she feels her left knee which has not been replaced is starting to cause her more pain, she is wondering if this could possibly be contributing to blood pressure elevation.  Previous to this knee infection she has not noticed any elevated pressures.  She checks them at home periodically approximately once a week.  The last one checked was a couple days ago and was 140s over 99.  She does state that her at home cuff is old and she thinks she may need to  purchase a new one.    Review of Systems:  All other systems are negative except as noted above.    PFSH:  Reviewed and updated.    Tobacco Use:  History   Smoking Status     Never Smoker   Smokeless Tobacco     Not on file       Vitals:  Vitals:    02/03/17 1002 02/03/17 1025   BP: 138/88 130/70   Patient Site: Right Arm    Patient Position: Sitting    Cuff Size: Adult Large    Pulse: 78    Resp: 16    Temp: 97.9  F (36.6  C)    TempSrc: Oral    Weight: 184 lb 2 oz (83.5 kg)      Wt Readings from Last 3 Encounters:   02/03/17 184 lb 2 oz (83.5 kg)   01/23/17 185 lb 9.6 oz (84.2 kg)   10/10/16 180 lb (81.6 kg)       Physical Exam:  Constitutional:  Reveals an alert, cooperative, 66 year old female in no acute distress.  Vitals:  Per nursing notes.  Cardiac:  Regular rate and rhythm without murmurs, rubs, or gallops. Carotids without bruits. Legs without edema.   Lungs: Clear.  Respiratory effort normal.  .     Data Reviewed:  Additional History from Old Records or Another Person Summarized (2 total): None.     Decision to Obtain Extra information (1 total): None.     Radiology Tests Summarized and Ordered (XRAY/CT/MRI/DXA) (1 total): None.    Labs Reviewed and Ordered (1 total): None.    Medicine Tests Summarized and Ordered (EKG/ECHO/COLONOSCOPY/EGD) (1 total): None.    Independent Review of EKG or X-Ray (2 each): None.    The visit lasted a total of 20 minutes face to face with the patient. Over 50% of the time was spent counseling and educating the patient about plan of care.    Medications:  Current Outpatient Prescriptions   Medication Sig Dispense Refill     acetaminophen-caffeine (EXCEDRIN) 500-65 mg Tab per tablet Take 1 tablet by mouth daily.       atenolol (TENORMIN) 25 MG tablet Take 25 mg by mouth daily.       calcium-vitamin D 500 mg(1,250mg) -200 unit per tablet Take 1 tablet by mouth daily.       cefuroxime (CEFTIN) 500 MG tablet Take 0.5 tablets (250 mg total) by mouth daily. 90 tablet 3      cholecalciferol, vitamin D3, 1,000 unit tablet Take 1,000 Units by mouth daily.       loratadine (CLARITIN) 10 mg tablet Take 10 mg by mouth daily.       OMEGA-3/DHA/EPA/FISH OIL (FISH OIL-OMEGA-3 FATTY ACIDS) 300-1,000 mg capsule Take 1 g by mouth daily.       omeprazole (PRILOSEC) 20 MG capsule Take 20 mg by mouth daily.       No current facility-administered medications for this visit.        Total Data Points: 0    CRISSY Hutchison, CNP    This note has been dictated using voice recognition software. Any grammatical or context distortions are unintentional and inherent to the software

## 2021-06-08 NOTE — PROGRESS NOTES
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  Only a liner exchange was done and the hardware has been retained.  Now on oral cefuroxime 250 mg by mouth once a day..    Doing quite well with respect to her knee, but concerned about some bowel cramping..        Plan:     No orders of the defined types were placed in this encounter.    Given how good the knee looks, no further testing is needed.  We discussed the pros and cons of remaining on antimicrobial chemotherapy.  The organism she had was only otherwise sensitive to quinolones, apparently which she had a reaction to.  So she's decided to stay on the daily cefuroxime.  Otherwise, we could consider discontinuing antimicrobial agents completely.     Subjective:      This is an follow-up Infectious Disease visit for Kelly Campbell, who is a 66 y.o.  referred for evaluation of right total knee arthroplasty infection.     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  She also grew E. coli out of her urine.  The joint had been implanted back in 2012.    She underwent minor exchange and debridement, but retention of complements otherwise.  She's been on IV ceftriaxone since discharge.    She says overall she is doing well with respect to the knee.  She is having less swelling and tenderness.  Her incision is healing well.    She's been having some problems with the PICC line as much as they cannot draw blood through it.  However they can still use it for infusions.  She's also had some problems with getting peripheral blood by the visiting nurses.    Her labs are now being done by [facility] but I was able to review them online and her CRP has normalized.  Remainder of her blood tests look good.    She has been in many questions about further treatments.  I did explain to him that the CRP test can only tell sufficient inflammation, and there is no test that can explain  if the infection is completely gone.    Update on July 11, 2016:  Tolerated the change to oral cefuroxime without difficulty.  No diarrhea.    Update on August 8, 2016: She is again doing well.  No significant pain redness or swelling in the right knee.  Fairly good range of motion.  Tolerating the antibiotics without difficulty.  No diarrhea or rash.  She says she does get some sleepiness sometimes.    Update on August 29, 2016: She is again doing quite well.  Good range of motion.  Wound appears to be healing quite well.  No diarrhea or rash.    Update on October 10, 2016: She is doing very well.  Good range of motion.  Has some questions about possible drug side effects.    Update on January 23, 2017: Still doing well with respect to the knee.  There is no pain or swelling.  She does complain of some intermittent GI discomfort.  She is wondering about going to every other day on the cefuroxime.    We did review the antimicrobial susceptibility studies, and the only other oral and microbials available would be the quinolones, for which she had problems with.  Therefore I did not recommend dropping her daily dose of cefuroxime.      The following portions of the patient's history were reviewed and updated as appropriate: allergies, current medications, past medical history, past surgical history and problem list.      Review of Systems  Performed and all negative except as mentioned above.      Objective:        Visit Vitals     /90     Pulse 88     Temp 97.4  F (36.3  C)     Wt 185 lb 9.6 oz (84.2 kg)     BMI 31.86 kg/m2     General:   alert, appears stated age and cooperative   Oropharynx:  lips, mucosa, and tongue normal; teeth and gums normal    Eyes:   Extraocular muscles intact, no icterus.    Ears:   Deferred   Neck:  no adenopathy and supple, symmetrical, trachea midline   Thyroid:   Deferred   Lung:  Normal respiratory pattern, no distress.   Heart:   Deferred   Abdomen:  Sitting at time of exam.    Extremities: She's got excellent range of motion.  The knee is not swollen red or warm.     Skin:  warm and dry, no hyperpigmentation, vitiligo, or suspicious lesions   CVA:   Deferred   Genitourinary:  defer exam   Neurological:   Grossly normal   Psychiatric:   normal mood, behavior, speech, dress, and thought processes         Marc Garrison MD

## 2021-06-09 NOTE — PROGRESS NOTES
Labs reviewed and normal except for cholesterol. Calculated 10 year risk of heart disease is 12.8% (low risk is <7.5%)   Risk is calculated based on age, race, gender, smoking status, diabetes status, blood pressure and this cholesterol result. Â It does NOT consider any additional risk related to weight or family history. Patient will let us know whether she would like to try diet and exercise for a few months or start medication.

## 2021-06-09 NOTE — PROGRESS NOTES
ASSESSMENT & PLAN:  1. Health maintenance examination  Healthy female exam, patient due for DEXA scan, mammogram.  These were ordered today and she can schedule at her convenience.  Will update fasting lab work today including vitamin D level and thyroid cascade.  Patient to return in 1 year or sooner as needed.  - DXA Bone Density Scan; Future  - Lipid [place] FASTING  - HM1(CBC and Differential)  - Basic Metabolic Panel  - Mammo Screening Bilateral; Future  - Vitamin D, Total (25-Hydroxy)  - HM1 (CBC with Diff)  - C-Reactive Protein(CRP)  - Thyroid Cascade    2. Osteopenia  Update Dexa scan-continue calcium vitamin D supplements as directed.    3. Vitamin D deficiency  Vitamin D check today, continue supplementation.    4. Localized Osteoarthritis Of The Knee  Right knee stable currently, status post knee replacement.  Left knee starting to bother her more, but she would like to avoid surgery as long as possible.  Let us know if you need a referral to see orthopedics for further evaluation.  Otherwise we could also pursue some PT in the future if you feel this would be helpful.    5. Obesity (BMI 30-39.9)  Patient continues to attempt to eat healthy and exercise, her left knee pain is somewhat limiting as far as exercise.  We will check labs today and make sure hormone levels are stable.  Offered referral to weight loss clinic if patient is interested she will let us know.    There are no Patient Instructions on file for this visit.    Orders Placed This Encounter   Procedures     DXA Bone Density Scan     Standing Status:   Future     Standing Expiration Date:   3/22/2018     Order Specific Question:   Can the procedure be changed per Radiologist protocol?     Answer:   Yes     Mammo Screening Bilateral     Standing Status:   Future     Standing Expiration Date:   6/22/2018     Order Specific Question:   Patient's previous breast density:     Answer:   Scattered fibroglandular density [2]     Order Specific  Question:   Can the procedure be changed per Radiologist protocol?     Answer:   Yes     Lipid [place] FASTING     Order Specific Question:   Fasting is required?     Answer:   Yes     Basic Metabolic Panel     Vitamin D, Total (25-Hydroxy)     HM1 (CBC with Diff)     C-Reactive Protein(CRP)     Thyroid Cascade     There are no discontinued medications.        General  Immunizations reviewed and updated .  Alcohol use, safety and moderation discussed.  Recommended adequate calcium intake/osteoporosis prevention.  Discussed colon cancer screening at age 50, 45 if -American.  Diet and exercise reviewed, including goal of aerobic exercise 30-90 minutes most days of the week, moderation of portions sizes, avoiding eating out and fast food and increase in fruits and vegetables.  Discussed safe sex practices.  Discussed & recommended seat belt (& motorcycle helmet) use.  Discussed & recommended smoke detector.  Discussed sun protection.  Discussed weight management.    The following high BMI interventions were performed this visit: encouragement to exercise, weight monitoring, exercise promotion: strength training, exercise promotion: stretching and dietary management education, guidance, and counseling    CHIEF COMPLAINT:  Chief Complaint   Patient presents with     Annual Exam     pt is fasting       HISTORY OF PRESENT ILLNESS:  Kelly is a 66 y.o. female presenting to the clinic today for a physical examination.  She is feeling well today with only a couple minor complaints.  She notes that her left knee has started to bother her more.  She has a history of osteoarthritis in the knees and has had a prior right knee replacement about 4 years ago.  She did have a septic knee infection about 1 year ago in this knee but this has healed well.  She does get some occasional edema on that extremity but it fluctuates.  Tends to occur when she is up on her feet quite a bit and once she rests in bed or walks last it will  resolve.  With the edema on the right leg she does occasionally get some numbness in her great toe with wearing shoes.  This only occurs when wearing certain shoes and when edema is at its worst.  It goes away with barefoot walking and is not a problem on a daily basis.  She does not desire any further evaluation on this currently.  She has not been wearing high-quality shoes and thinks this may be contributing.  These issues she is feeling well, she continues to struggle with weight gain postmenopausally.  She feels she is eating healthfully and getting some good activity, but still maintaining or gaining weight.  We discussed checking labs today for this and otherwise we can always refer her to the weight loss clinic if needed.  History of normal Paps, last Pap was several years back and she does not desire to have any more after age 63.  Mammogram is due this year.  She is also due to update DEXA scan with history of osteopenia.    REVIEW OF SYSTEMS:   All other systems are negative.    PFSH:  Social History:  The patient reports that there is not domestic violence in her life.     Regular Exercise: yes  Sunscreen Use: yes  Healthy Diet: yes  Dental Visits Regularly: yes  Sexually active: yes  Colonoscopy: not applicable  Prevention of Osteoporosis: not applicable   Last DXA: yes    Social History     Social History     Marital status:      Spouse name: N/A     Number of children: N/A     Years of education: N/A     Occupational History     Not on file.     Social History Main Topics     Smoking status: Never Smoker     Smokeless tobacco: Not on file     Alcohol use Not on file      Comment: 2-3 drinks a year     Drug use: No     Sexual activity: Not on file     Other Topics Concern     Not on file     Social History Narrative    05/16/16 - Patient resides with her .        History   Smoking Status     Never Smoker   Smokeless Tobacco     Not on file       Family History:  Family History   Problem  Relation Age of Onset     Heart disease Mother      Hypertension Mother      Stroke Mother      Asthma Mother      Cancer Father        Past Medical History:  Active Ambulatory Problems     Diagnosis Date Noted     Migraine Headache      Rheumatic Fever      Breast Cyst      Overweight      Benign essential HTN      Edema      Motion Sickness      Asymptomatic Postmenopausal Status      Localized Osteoarthritis Of The Knee      Chronic Colitis      Osteopenia      Vitamin D Deficiency      Rhinitis      Obesity (BMI 30-39.9) 10/21/2015     FRANCI (acute kidney injury)      Knee pain, acute, right      Resolved Ambulatory Problems     Diagnosis Date Noted     Anemia Secondary To Blood Loss      Septic arthritis of knee, right 2016     Infection of prosthetic right knee joint, initial encounter      UTI (lower urinary tract infection)      Past Medical History:   Diagnosis Date     Hypertension      Osteopenia        Allergies   Allergen Reactions     Hydromorphone Itching     Iodine      Tolerates topical iodine     Ciprofloxacin Itching     Was itchy all over with cipro infusion.  Went away when infusion was stopped.       Immunization History   Administered Date(s) Administered     DT (pediatric) 1997, 2006     Hep A, historic 07/15/2009, 2010     Influenza, inj, historic 10/09/2009     Pneumo Conj 13-V (2010&after) 10/21/2015     Pneumo Polysac 23-V 2014     Td, historic 2006     Tdap 2011     ZOSTER 07/15/2009     Immunization status: up to date and documented    Gynecologic History:  No LMP recorded. Patient is postmenopausal.  Contraception:none  Last Pap: several years, does not want to do more. Results were: normal  Last mammogram: . Results were: normal    OB History:  OB History      Para Term  AB TAB SAB Ectopic Multiple Living    3 3 3                 Surgical History:  Past Surgical History:   Procedure Laterality Date     INCISION AND DRAINAGE OF  "WOUND Right 5/17/2016    Procedure: INCISION DRAINAGE AND  DEBRIDEMENT OF RIGHT KNEE AND RIGHT TIBIAL INSERT REVISION;  Surgeon: Castillo Blas MD;  Location: [facility];  Service:      WA APPENDECTOMY      Description: Appendectomy;  Recorded: 07/15/2009;     WA LIGATE FALLOPIAN TUBE      Description: Tubal Ligation;  Recorded: 07/15/2009;     WA REVISE KNEE JOINT REPLACE,ALL PARTS Right 5/17/2016    Procedure: RIGHT TIBIAL INSERT REVISION;  Surgeon: Castillo Blas MD;  Location: [facility];  Service: Orthopedics     WA TOTAL KNEE ARTHROPLASTY      Description: Total Knee Arthroplasty;  Recorded: 04/20/2012;  Comments: 4/12---some post op anemia; Performed by Dr. Gresham with [facility]       VITALS:  Vitals:    03/22/17 1018   BP: 134/80   Patient Site: Left Arm   Patient Position: Sitting   Cuff Size: Adult Large   Pulse: 78   Resp: 16   Temp: 97.9  F (36.6  C)   TempSrc: Oral   Weight: 188 lb 1 oz (85.3 kg)   Height: 5' 4\" (1.626 m)     Wt Readings from Last 3 Encounters:   03/22/17 188 lb 1 oz (85.3 kg)   02/03/17 184 lb 2 oz (83.5 kg)   01/23/17 185 lb 9.6 oz (84.2 kg)       PHYSICAL EXAM:  General Appearance: Reveals an alert, pleasant 66 year old female.   Vitals:  Per nursing notes.  Head: Normocephalic, without obvious abnormality, atraumatic   Eyes: PERRL, conjunctiva/corneas clear, EOM's intact   Ears: Normal TM's and external ear canals, both ears   Oropharynx: Nares normal, septum midline, mucosa normal, no drainage, lips, and tongue normal   Neck: Supple, symmetrical, trachea midline, no adenopathy; thyroid: not enlarged, symmetric, no tenderness/mass/nodules   Back: Symmetric, no curvature, ROM normal, no CVA tenderness   Lungs: Clear to auscultation bilaterally, respirations unlabored, no wheezing or rales   Heart: Regular rate and rhythm, S1 and S2 normal, no murmur, rub, or gallop, no carotid bruits  Breasts: No breast masses, tenderness, asymmetry, or nipple discharge. "   Abdomen: Soft, non-tender, no masses, no organomegaly,  Pelvic: Normal-appearing female genitalia. No adnexal masses or cervical motion tenderness on bimanual exam.   Extremities: Extremities normal, atraumatic, no cyanosis or edema   Skin: Skin color, texture, turgor normal, no rashes or lesions   Lymph nodes: Cervical, supraclavicular, and axillary nodes normal.  Neurologic: Normal   Psych: Normal affect. Does not appear anxious or depressed.     DATA REVIEWED:  Additional History from Old Records or Another Person Summarized (2 total): None.     Decision to Obtain Extra information (1 total): None.     Radiology Tests Summarized and Ordered (XRAY/CT/MRI/DXA) (1 total): None.    Labs Reviewed and Ordered (1 total): None.    Medicine Tests Summarized and Ordered (EKG/ECHO/COLONOSCOPY/EGD) (1 total): None.    Independent Review of EKG or X-Ray (2 each): None.    The visit lasted a total of 45 minutes face to face with the patient. Over 50% of the time was spent conducting the physical and in coordination of care.      MEDICATIONS:  Current Outpatient Prescriptions   Medication Sig Dispense Refill     acetaminophen-caffeine (EXCEDRIN) 500-65 mg Tab per tablet Take 1 tablet by mouth daily.       atenolol (TENORMIN) 50 MG tablet Take 1 tablet (50 mg total) by mouth daily. 90 tablet 1     calcium-vitamin D 500 mg(1,250mg) -200 unit per tablet Take 1 tablet by mouth daily.       cholecalciferol, vitamin D3, 1,000 unit tablet Take 1,000 Units by mouth daily.       loratadine (CLARITIN) 10 mg tablet Take 10 mg by mouth daily.       OMEGA-3/DHA/EPA/FISH OIL (FISH OIL-OMEGA-3 FATTY ACIDS) 300-1,000 mg capsule Take 1 g by mouth daily.       omeprazole (PRILOSEC) 20 MG capsule Take 20 mg by mouth daily.       cefuroxime (CEFTIN) 500 MG tablet Take 0.5 tablets (250 mg total) by mouth daily. 90 tablet 3     No current facility-administered medications for this visit.        Total Data Points: 1    Mackenzie Venegas CNP    This note  has been dictated using voice recognition software. Any grammatical or context distortions are unintentional and inherent to the software

## 2021-06-09 NOTE — PROGRESS NOTES
Pt was here for nurse only BP check.    today's /82  rechecked with pt home cuff -/75.    Made copy of pt's home readings which range from 102/54 to 147/82.  Pt needs a refill for Atenolol 25 mg which is t'd up.  Please see notes below from recent Office visit on 2/03/17 regarding BP      Assessment & Plan:  1. Essential hypertension  Blood pressure in clinic is very close to normal range today. Given recent infection and the possible effect that could be having on her blood pressure we will continue to monitor for 2 weeks and have her return for a nurse only blood pressure check. She is to check her blood pressures at home on a daily basis and bring the log with her as well as her at home cuff to her next appointment for comparison. Make any medication adjustments needed at that time, no adjustments made today until we get more data.

## 2021-06-12 NOTE — PROGRESS NOTES
Assessment/Plan:    Kelly Campbell is a 69 y.o. female presenting for:    1. Benign essential hypertension  Blood pressure continues to be elevated on recheck.  Increase atenolol to 100 mg daily.  She will contact me with her blood pressure readings in 3 to 4 weeks.  - Lipid [place] FASTING  - Comprehensive Metabolic Panel  - atenoloL (TENORMIN) 100 MG tablet; Take 1 tablet (100 mg total) by mouth daily.  Dispense: 90 tablet; Refill: 3    2. Hypovitaminosis D  - Vitamin D, Total (25-Hydroxy)    3. Restless leg syndrome  She has recently had a work-up with her iron levels which were normal.  Mirapex sent to the pharmacy at a very low dose.  Discussed risks and benefits of the medication.  Discussed that she does not need to take it every night.  She will take 1 tablet at night for a week and then increase to 2 tablets if necessary.  - pramipexole (MIRAPEX) 0.125 MG tablet; Take 1 tablet (0.125 mg total) by mouth at bedtime.  Dispense: 30 tablet; Refill: 3    4. Hyperlipidemia LDL goal <130  Recheck cholesterol levels today    Her  recently passed away a few months ago.  The patient does not want to discuss this today and states that she has a good support system.    Medications Discontinued During This Encounter   Medication Reason     benzonatate (TESSALON PERLES) 100 MG capsule Therapy completed     atenoloL (TENORMIN) 50 MG tablet            Chief Complaint:  Chief Complaint   Patient presents with     Blood Pressure Check     Medication Education Visit     Med check     Restless Legs     Labs Only     Fasting for labs today       Subjective:   Kelly Campbell is a pleasant 69-year-old female with a past medical history significant for hypertension presenting to the clinic today for a medication check.    Patient is currently taking atenolol.  She notes that her blood pressures have been somewhat elevated at home.  She is unclear why exactly this is.  She does admit to being a bit less active during  "COVID-19.  Blood pressures at home are generally in the mid 140s over low 90s.  She does note that when her blood pressure is high sometimes she will hear a \"whooshing\" in her left ear and sometimes have a mild headache as well.    She denies any chest pain or shortness of breath.    The patient also notes that she has restless legs.  She has described this before to other providers.  Her ferritin level was checked at 1 point which was normal.  She wonders about medications for restless legs.  The only really occurs at night and not throughout the day.    12 point review of systems completed and negative except for what has been described above    Social History     Tobacco Use   Smoking Status Never Smoker   Smokeless Tobacco Never Used       Current Outpatient Medications   Medication Sig     acetaminophen-caffeine (EXCEDRIN) 500-65 mg Tab per tablet Take 1-2 tablets by mouth daily as needed.            calcium-vitamin D 500 mg(1,250mg) -200 unit per tablet Take 1 tablet by mouth daily.     FA/mv,Ca,iron,min/lycopene/lut (MULTIVITAL ORAL) Take 1 tablet by mouth daily.     loratadine (CLARITIN) 10 mg tablet Take 10 mg by mouth once a week.      omeprazole (PRILOSEC) 20 MG capsule Take 20 mg by mouth daily.     atenoloL (TENORMIN) 100 MG tablet Take 1 tablet (100 mg total) by mouth daily.     ibuprofen (ADVIL,MOTRIN) 600 MG tablet Take 600 mg by mouth every 6 (six) hours as needed for pain.     pramipexole (MIRAPEX) 0.125 MG tablet Take 1 tablet (0.125 mg total) by mouth at bedtime.         Objective:  Vitals:    11/04/20 0926 11/04/20 0947   BP: (!) 173/106 151/87   Pulse: 68    Resp: 12    Temp: 97.8  F (36.6  C)    TempSrc: Oral    Weight: 187 lb 6 oz (85 kg)    Height: 5' 2.25\" (1.581 m)        Body mass index is 34 kg/m .    Vital signs reviewed and stable  General: No acute distress  Psych: Appropriate affect  HEENT: moist mucous membranes, pupils equal, round, reactive to light and accomodation, posterior " oropharynx clear of erythema or exudate, tympanic membranes are pearly grey bilaterally  Lymph: no cervical or supraclavicular lymphadenopathy  Cardiovascular: regular rate and rhythm with no murmur  Pulmonary: clear to auscultation bilaterally with no wheeze  Abdomen: soft, non tender, non distended with normo-active bowel sounds  Extremities: warm and well perfused with no edema  Skin: warm and dry with no rash         This note has been dictated and transcribed using voice recognition software.   Any errors in transcription are unintentional and inherent to the software.

## 2021-06-13 NOTE — TELEPHONE ENCOUNTER
OK - I will send hydrochlorothiazide instead.  I should see her in the office in 2 weeks to recheck BP and labs    She can call [phone] to schedule at the [facility] -or she can follow-up with Dr. Ahn (her PCP) at [facility]    BB

## 2021-06-13 NOTE — TELEPHONE ENCOUNTER
Called pt, notified of notes per MD.   Pt states that she will follw Dr. Mcclure to [place]. Gave info to schedule.   Closing encounter.

## 2021-06-13 NOTE — TELEPHONE ENCOUNTER
WU - Status Update  Who is Calling: Patient  Update: Has been taking Atenolol for about a month now and her blood pressure is still elevated. Yesterday's reading was 173/89, she only took it once. She prefers not to take the Lisinopril, in the past she developed a deep dry cough so when she heard that she got a little nervous. Please advise.  Okay to leave a detailed message?:  Yes

## 2021-06-14 NOTE — TELEPHONE ENCOUNTER
Refill Approved    Rx renewed per Medication Renewal Policy. Medication was last renewed on 12/3/20.    Tracy Hebert, [facility] Triage/Med Refill 12/28/2020     Requested Prescriptions   Pending Prescriptions Disp Refills     hydroCHLOROthiazide (HYDRODIURIL) 25 MG tablet [Pharmacy Med Name: HYDROCHLOROTHIAZIDE 25 MG TAB] 30 tablet 0     Sig: TAKE 1 TABLET BY MOUTH EVERY DAY       Diuretics/Combination Diuretics Refill Protocol  Passed - 12/26/2020 12:35 PM        Passed - Visit with PCP or prescribing provider visit in past 12 months     Last office visit with prescriber/PCP: 11/4/2020 Mimi Mcclure MD OR same dept: 11/4/2020 Mimi Mcclure MD OR same specialty: 11/4/2020 Mimi Mcclure MD  Last physical: Visit date not found Last MTM visit: Visit date not found   Next visit within 3 mo: Visit date not found  Next physical within 3 mo: Visit date not found  Prescriber OR PCP: Mimi Mcclure MD  Last diagnosis associated with med order: 1. Benign essential hypertension  - hydroCHLOROthiazide (HYDRODIURIL) 25 MG tablet [Pharmacy Med Name: HYDROCHLOROTHIAZIDE 25 MG TAB]; TAKE 1 TABLET BY MOUTH EVERY DAY  Dispense: 30 tablet; Refill: 0    If protocol passes may refill for 12 months if within 3 months of last provider visit (or a total of 15 months).             Passed - Serum Potassium in past 12 months      Lab Results   Component Value Date    Potassium 4.3 11/04/2020             Passed - Serum Sodium in past 12 months      Lab Results   Component Value Date    Sodium 142 11/04/2020             Passed - Blood pressure on file in past 12 months     BP Readings from Last 1 Encounters:   11/04/20 151/87             Passed - Serum Creatinine in past 12 months      Creatinine   Date Value Ref Range Status   11/04/2020 0.76 0.60 - 1.10 mg/dL Final

## 2021-06-15 NOTE — PROGRESS NOTES
Assessment:      Acute Bronchitis      Plan:      Explained lack of efficacy of antibiotics in viral disease.  Antitussives per medication orders.  B-agonist inhaler.   OTC analgesics discussed  Discussed signs of worsening infection and when to follow-up with PCP if no symptom improvement.     Patient Instructions   You were seen today for acute bronchitis. This is likely due to a viral illness.    Symptom management:  - Get plenty of rest  - Avoid smoking and second hand smoke  - May take tylenol or ibuprofen for fever/discomfort  - Drink plenty of non-caffeinated fluids  - May use tessalon perles to help suppress the cough    Reasons to be seen in the emergency room:  - Develop a fever of 100.4 or higher  - Cough changes, coughing up blood, or become short of breath  - Neck stiffness  - Chest pain  - Severe headache  - Unable to tolerate eating or drinking fluids    Other things to help with cough suppression:  - Honey  - Micheal's VaporRub  - Robitussin    Otherwise, if no symptom improvement after 5 days, follow-up with your primary care provider.      Subjective:       Kelly Campbell is a 67 y.o. female here for evaluation of a cough.  The cough is productive of yellow sputum, without wheezing, dyspnea or hemoptysis and is worse at night. Onset of symptoms was 12/22/2017, symptoms changing since that time. Feels as though the cough is deeper.  Associated symptoms include subjective fever, rhinorrhea, and fatigue. Patient does not have a history of asthma. Patient has not had recent travel. Patient does not have a history of smoking.     The following portions of the patient's history were reviewed and updated as appropriate: allergies, current medications and problem list.    Review of Systems  Pertinent items are noted in HPI.     Allergies  Allergies   Allergen Reactions     Hydromorphone Itching     Iodine      Tolerates topical iodine     Ciprofloxacin Itching     Was itchy all over with cipro infusion.   Went away when infusion was stopped.         Objective:      /86 (Patient Site: Left Arm, Patient Position: Sitting, Cuff Size: Adult Regular)  Pulse 78  Temp 97.9  F (36.6  C) (Oral)   Resp 18  Wt 188 lb (85.3 kg)  SpO2 98%  BMI 32.27 kg/m2  General appearance: alert, appears stated age, cooperative, no distress and non-toxic  Head: Normocephalic, without obvious abnormality, atraumatic, sinuses nontender to percussion  Ears: normal TM's and external ear canals both ears  Nose: clear discharge  Throat: post-nasal drip present, no tonsil swelling, erythema, or exudate; MMM, lips, tongue, and gums normal  Neck: no adenopathy and supple, symmetrical, trachea midline  Lungs: ronchi in lower lung bases bilaterally, no rales, no wheezing  Heart: regular rate and rhythm, S1, S2 normal, no murmur, click, rub or gallop    Imaging    Xr Chest 2 Views    Result Date: 1/11/2018  EXAM DATE:         01/11/2018 [facility] X-RAY CHEST, 2 VIEWS, FRONTAL AND LATERAL 1/11/2018 1:00 PM INDICATION: cough for over 2 weeks, no improvement; ruleout pn COMPARISON: None. FINDINGS: The lungs are clear of focal infiltrates. There is no pneumothorax or pleural effusion. The heart size and pulmonary vascularity are normal. There is no free air under the diaphragm.     I personally reviewed and discussed findings with the patient.

## 2021-06-16 PROBLEM — D62 ANEMIA DUE TO BLOOD LOSS, ACUTE: Status: ACTIVE | Noted: 2019-03-28

## 2021-06-16 PROBLEM — N39.3 STRESS INCONTINENCE OF URINE: Status: ACTIVE | Noted: 2018-02-21

## 2021-06-16 PROBLEM — G25.81 RESTLESS LEG SYNDROME: Status: ACTIVE | Noted: 2019-05-07

## 2021-06-16 PROBLEM — N95.2 ATROPHIC VAGINITIS: Status: ACTIVE | Noted: 2019-05-07

## 2021-06-16 PROBLEM — Z96.652 STATUS POST TOTAL LEFT KNEE REPLACEMENT: Status: ACTIVE | Noted: 2019-03-27

## 2021-06-16 PROBLEM — E78.00 HYPERCHOLESTEREMIA: Status: ACTIVE | Noted: 2018-02-21

## 2021-06-16 PROBLEM — K21.9 GASTROESOPHAGEAL REFLUX DISEASE WITHOUT ESOPHAGITIS: Status: ACTIVE | Noted: 2019-12-11

## 2021-06-16 PROBLEM — M25.811 SHOULDER IMPINGEMENT, RIGHT: Status: ACTIVE | Noted: 2018-02-21

## 2021-06-16 NOTE — PROGRESS NOTES
Assessment and Plan:     1. Routine general medical examination at a health care facility  Immunizations reviewed---seasonal flu shot today  Colonosocopy reviewed '10, repeat '20  Mammography reviewed 3/17; repeat 3/19  Routine Dental and Eye care recommended  Discussed importance of regular exercise and appropriate calcium intake  Discussed Advance Directives---on file; no changes per pt    2. Benign essential HTN  Stable w/ current medication---refill and check routine labs; pt will be advised of results when available  - atenolol (TENORMIN) 50 MG tablet; Take 1 tablet (50 mg total) by mouth daily.  Dispense: 90 tablet; Refill: 3  - Comprehensive Metabolic Panel    3. Osteopenia  DEXA '17; repeat '20  Recommend calcium intake (12-1500mg/day) with vitamin D (800-1000 IU daily) as well as weight bearing exercise to include strength and balance.  Calcium supplement should be calcium CITRATE    4. Colitis  Note from GI reviewed 2013.  Patient at that time was on balsalazide but weaned away from it.  She reports symptoms under control now.  Routine colonoscopy as above.    5. Chronic rhinitis  Daily Claritin for control of symptoms.    6. Hypercholesteremia  Given intolerance to statin.  She was prescribed fenofibrate but never started it.  We talk about mechanism of action of this medication and it should not lead to the same side effects as a statin.  She will start this prescription (Rx sent now) and recheck labs in 2 months.  We discussed minimal evidence for using supplements including red yeast rice and fish oil.  Okay to discontinue the supplements.  - Lipid Cascade; Future  - Hepatic Profile; Future    7. Bulky or enlarged uterus  Schedule ultrasound to further evaluate.  Pt asymptomatic.  - US Pelvis With Transvaginal Non OB; Future    8. Postmenopausal atrophic vaginitis  Evaluate above before considering treatment with hormone.  Discussed options of treatment including topical estrogen cream, Estring, or  "vaginal tabs.    9. Chronic right shoulder pain  Discussed with patient symptoms may be related to bursitis or tendinitis or arthritis.  Will start with x-ray.  Consider physical therapy and/or orthopedic consult based on results.  She defers referral at this time.  We talk about importance of range of motion of the shoulder and continued activity to avoid\" frozen shoulder\".  Okay to use Tylenol or ibuprofen for discomfort.  She will contact me if worse or not improving.  We will advise her regarding x-ray results when available.  - XR Shoulder Right 2 or More VWS; Future     The patient's current medical problems were reviewed.    I have had an Advance Directives discussion with the patient.  The following high BMI interventions were performed this visit: encouragement to exercise and weight monitoring  The following health maintenance schedule was reviewed with the patient and provided in printed form in the after visit summary:   Health Maintenance   Topic Date Due     INFLUENZA VACCINE RULE BASED (1) 08/01/2017     FALL RISK ASSESSMENT  02/21/2019     MAMMOGRAM  03/29/2019     DXA SCAN  03/29/2019     COLONOSCOPY  07/01/2020     ADVANCE DIRECTIVES DISCUSSED WITH PATIENT  10/21/2020     TD 18+ HE  06/08/2021     PNEUMOCOCCAL POLYSACCHARIDE VACCINE AGE 65 AND OVER  Completed     PNEUMOCOCCAL CONJUGATE VACCINE FOR ADULTS (PCV13 OR PREVNAR)  Completed     ZOSTER VACCINE  Completed        Subjective:   Chief Complaint: Kelly Campbell is an 67 y.o. female here for an Annual Wellness visit.   HPI: Kelly is a 67-year-old female in today for her annual wellness visit.  She is fasting for labs.  Patient has a history of elevated cholesterol and is intolerant to statin (upper neck shoulder achiness with trials of atorvastatin and simvastatin).  She is been given a prescription for fenofibrate but thought this may be another statin so never started it.  She would be willing to trial that medication.  Currently her only " treatment is an over-the-counter supplement of red yeast rice and fish oil.  She is doing no form of regular exercise and BMI is consistent with obesity.  She does have hypertension but is not diabetic, is a non-smoker.    In regards to her blood pressure, she reports compliance with her atenolol and no side effects or concerns.  She has no chest pain, shortness of breath, lower extremity edema, headaches, or vision changes.  She does wear glasses and does routine eye checks.  Patient has a history of colitis and was following with GI for a time.  She reports symptoms are currently under control.  Her last colonoscopy 2010 with plan to repeat in 2020.  Patient has been having some bladder leakage (with cough/sneeze/bending) and minor vaginal irritation.  No discharge, no bleeding.  She denies any significant sexual activity as her  had prostate surgery and is no longer able to maintain erection.  Kelly complaints today that she is been having some pain in her right shoulder over the last couple months.  She thinks it started when she had a significant upper respiratory infection did a large amount of coughing.  There is otherwise no history of any injury or trauma.  She states it aches from her anterior shoulder into her upper arm.  She has no neck complaints, no upper extremity numbness, tingling, or weakness.  She is right-hand dominant.  No symptoms on the opposite side.  She has had previous total knee on the right for significant osteoarthritis.    Review of Systems: Please see above.  The rest of the review of systems are negative for all systems.    Medication list, problem list reviewed and reconciled.  Family history and surgical history reviewed and up-to-date.    Patient Care Team:  Merari Wesley MD as PCP - General     Patient Active Problem List   Diagnosis     Breast Cyst     Benign essential HTN     Edema     Motion Sickness     Asymptomatic Postmenopausal Status     Localized Osteoarthritis Of  The Knee     Chronic Colitis     Osteopenia     Rhinitis     Obesity (BMI 30-39.9)     FRANCI (acute kidney injury)     Knee pain, acute, right     Past Medical History:   Diagnosis Date     Hypertension      Osteopenia       Past Surgical History:   Procedure Laterality Date     INCISION AND DRAINAGE OF WOUND Right 5/17/2016    Procedure: INCISION DRAINAGE AND  DEBRIDEMENT OF RIGHT KNEE AND RIGHT TIBIAL INSERT REVISION;  Surgeon: Castillo Blas MD;  [place] [facility] [place]:      [place] APPENDECTOMY      Description: Appendectomy;  Recorded: 07/15/2009;     [place] LIGATE FALLOPIAN TUBE      Description: Tubal Ligation;  Recorded: 07/15/2009;     [place] REVISE KNEE JOINT REPLACE,ALL PARTS Right 5/17/2016    Procedure: RIGHT TIBIAL INSERT REVISION;  Surgeon: Castillo Blas MD;  [place] [facility] [place]  Service: [place]opedics     [place] TOTAL KNEE ARTHROPLASTY      Description: Total Knee Arthroplasty;  Recorded: 04/20/2012;  Comments: 4/12---some post op anemia; Performed by Dr. Gresham with [facility]      Family History   Problem Relation Age of Onset     Heart disease Mother      Hypertension Mother      Stroke Mother      Asthma Mother      Cancer Father       Social History     Social History     Marital status:      Spouse name: N/A     Number of children: N/A     Years of education: N/A     Occupational History     Not on file.     Social History Main Topics     Smoking status: Never Smoker     Smokeless tobacco: Never Used     Alcohol use Not on file      Comment: 2-3 drinks a year     Drug use: No     Sexual activity: Not on file     Other Topics Concern     Not on file     Social History Narrative    05/16/16 - Patient resides with her .       Current Outpatient Prescriptions   Medication Sig Dispense Refill     atenolol (TENORMIN) 50 MG tablet Take 1 tablet (50 mg total) by mouth daily. 90 tablet 0     calcium-vitamin D 500 mg(1,250mg) -200 unit per tablet Take 1 tablet by mouth  "daily.       cholecalciferol, vitamin D3, 1,000 unit tablet Take 1,000 Units by mouth daily.       loratadine (CLARITIN) 10 mg tablet Take 10 mg by mouth daily.       OMEGA-3/DHA/EPA/FISH OIL (FISH OIL-OMEGA-3 FATTY ACIDS) 300-1,000 mg capsule Take 1 g by mouth daily.       omeprazole (PRILOSEC) 20 MG capsule Take 20 mg by mouth daily.       RED YEAST RICE ORAL Take by mouth.       acetaminophen-caffeine (EXCEDRIN) 500-65 mg Tab per tablet Take 1 tablet by mouth daily.       No current facility-administered medications for this visit.       Objective:   Vital Signs:   Visit Vitals     /80 (Patient Site: Left Arm, Patient Position: Sitting, Cuff Size: Adult Large)     Pulse 76     Temp 98.1  F (36.7  C) (Oral)     Resp 16     Ht 5' 4\" (1.626 m)     Wt 187 lb 7 oz (85 kg)     BMI 32.17 kg/m2        VisionScreening:  No exam data present     PHYSICAL EXAM      General Appearance:    Alert, cooperative, no distress, appears stated age--- wearing glasses   Head:    Normocephalic, without obvious abnormality, atraumatic   Eyes:    PERRL, conjunctiva/corneas clear, EOM's intact both eyes   Ears:    Normal TM's and external ear canals, both ears   Nose:   Nares normal, septum midline, mucosa normal   Throat:   Lips, mucosa, and tongue normal; teeth and gums normal   Neck:   Supple, symmetrical, trachea midline, no adenopathy;     thyroid:  no enlargement/tenderness/nodules; no carotid    bruit or JVD   Back:     Symmetric, no curvature, no CVA tenderness   Lungs:     Clear to auscultation bilaterally, respirations unlabored   Chest Wall:    No tenderness or deformity    Heart:    Regular rate and rhythm, S1 and S2 normal, no murmur, rub   or gallop   Breast Exam:    No tenderness, masses, or nipple abnormality   Abdomen:     Soft, non-tender, bowel sounds active all four quadrants,     no masses, no organomegaly   Genitalia:   Significant postmenopausal atrophic changes externally with mild irritation; no lesions or " discharge are noted at the introitus.  Bimanual exam shows significantly enlarged uterus to the umbilicus.  It is firm but nontender.  Unable to appreciate adnexa given the enlarged uterine mass.   Rectal:   Normal external rectal tissue.   Extremities:   Extremities normal, atraumatic, no cyanosis or edema   Pulses:   2+ and symmetric all extremities   Skin:   Skin color, texture, turgor normal, no rashes; large Abhinav K right groin chronic and unchanged per patient.       Neurologic:   CNII-XII intact             Assessment Results 2/21/2018   Activities of Daily Living No help needed   Instrumental Activities of Daily Living No help needed   Mini Cog Total Score 4   Some recent data might be hidden     A Mini-Cog score of 0-2 suggests the possibility of dementia, score of 3-5 suggests no dementia    Identified Health Risks:     She is at risk for lack of exercise and has been provided with information to increase physical activity for the benefit of her well-being.  The patient was counseled and encouraged to consider modifying their diet and eating habits. She was provided with information on recommended healthy diet options.  Patient's advanced directive was discussed and I am comfortable with the patient's wishes.

## 2021-06-16 NOTE — PROGRESS NOTES
Preoperative Exam    Scheduled Procedure: Hysterectomy   Surgery Date:  3/14/2018  Surgery Location: [facility]     Surgeon:  Dr. Rodriguez     Assessment/Plan:     Problem List Items Addressed This Visit     None      Visit Diagnoses     Pre-op examination    -  Primary    Relevant Orders    Electrocardiogram Perform and Read (Completed)            Surgical Procedure Risk: Intermediate (reported cardiac risk generally 1-5%)  Have you had prior anesthesia?: Yes  Have you or any family members had a previous anesthesia reaction:  No  Do you or any family members have a history of a clotting or bleeding disorder?: No  Cardiac Risk Assessment: no increased risk for major cardiac complications    Patient approved for surgery with general or local anesthesia.        Functional Status: Independent  Patient plans to recover at home with family.     Subjective:      Kelly Campbell is a 67 y.o. female who presents for a preoperative consultation.  She will be having a total hysterectomy with Dr. Samuel on March 14.  Patient was sent to see oncology after finding of a mass consistent with an ovarian neoplasm on CT scan after her annual physical this past February.  She is found to have an enlarged uterus on exam, ultrasound showed some concerning findings and they proceeded to CT scan.  Patient is looking forward to surgery.  She has tolerated previous procedures under general anesthesia well.  She has had no anesthesia reaction or bleeding problems with prior procedures.  She is feeling well recently without fever, chills, cold or cough symptoms.  No recent chest pain or palpitations.  EKG done today looks well.  We discussed which medications to take in which to stop prior to her procedure.    All other systems reviewed and are negative, other than those listed in the HPI.    Pertinent History  Do you have difficulty breathing or chest pain after walking up a flight of stairs: No  History of obstructive sleep  apnea: No  Steroid use in the last 6 months: No  Frequent Aspirin/NSAID use: Yes: excedrin on a regular basis  Prior Blood Transfusion: No  Prior Blood Transfusion Reaction: No  If for some reason prior to, during or after the procedure, if it is medically indicated, would you be willing to have a blood transfusion?:  There is no transfusion refusal.    Current Outpatient Prescriptions   Medication Sig Dispense Refill     acetaminophen-caffeine (EXCEDRIN) 500-65 mg Tab per tablet Take 1 tablet by mouth daily.       atenolol (TENORMIN) 50 MG tablet Take 1 tablet (50 mg total) by mouth daily. 90 tablet 3     calcium-vitamin D 500 mg(1,250mg) -200 unit per tablet Take 1 tablet by mouth daily.       cholecalciferol, vitamin D3, 1,000 unit tablet Take 1,000 Units by mouth daily.       fenofibrate (TRICOR) 54 MG tablet Take 1 tablet (54 mg total) by mouth daily. 90 tablet 3     loratadine (CLARITIN) 10 mg tablet Take 10 mg by mouth daily.       OMEGA-3/DHA/EPA/FISH OIL (FISH OIL-OMEGA-3 FATTY ACIDS) 300-1,000 mg capsule Take 1 g by mouth daily.       omeprazole (PRILOSEC) 20 MG capsule Take 20 mg by mouth daily.       RED YEAST RICE ORAL Take by mouth.       No current facility-administered medications for this visit.         Allergies   Allergen Reactions     Hydromorphone Itching     Iodine      Tolerates topical iodine     Ciprofloxacin Itching     Was itchy all over with cipro infusion.  Went away when infusion was stopped.       Patient Active Problem List   Diagnosis     Benign essential HTN     Motion Sickness     Localized Osteoarthritis Of The Knee     Colitis     Osteopenia     Rhinitis     Obesity (BMI 30-39.9)     Hypercholesteremia     Stress incontinence of urine     Shoulder impingement, right     Ovarian mass       Past Medical History:   Diagnosis Date     Hypertension      Osteopenia        Past Surgical History:   Procedure Laterality Date     INCISION AND DRAINAGE OF WOUND Right 5/17/2016    Procedure:  "INCISION DRAINAGE AND  DEBRIDEMENT OF RIGHT KNEE AND RIGHT TIBIAL INSERT REVISION;  Surgeon: Castillo Blas MD;  Location: [facility];  Service:      [place] APPENDECTOMY      Description: Appendectomy;  Recorded: 07/15/2009;     [place] LIGATE FALLOPIAN TUBE      Description: Tubal Ligation;  Recorded: 07/15/2009;     [place] REVISE KNEE JOINT REPLACE,ALL PARTS Right 5/17/2016    Procedure: RIGHT TIBIAL INSERT REVISION;  Surgeon: Castillo Blas MD;  Location: [facility];  Service: Orthopedics     [place] TOTAL KNEE ARTHROPLASTY      Description: Total Knee Arthroplasty;  Recorded: 04/20/2012;  Comments: 4/12---some post op anemia; Performed by Dr. Gresham with [facility]       Social History     Social History     Marital status:      Spouse name: N/A     Number of children: N/A     Years of education: N/A     Occupational History     Not on file.     Social History Main Topics     Smoking status: Never Smoker     Smokeless tobacco: Never Used     Alcohol use Not on file      Comment: 2-3 drinks a year     Drug use: No     Sexual activity: Not on file     Other Topics Concern     Not on file     Social History Narrative    05/16/16 - Patient resides with her .        Patient Care Team:  Merari Wesley MD as PCP - General          Objective:     Vitals:    03/09/18 1049   BP: 136/80   Pulse: 72   Resp: 14   Temp: 97.9  F (36.6  C)   TempSrc: Oral   Weight: 185 lb 2 oz (84 kg)   Height: 5' 4\" (1.626 m)         Physical Exam:   /80 (Patient Site: Left Arm, Patient Position: Sitting, Cuff Size: Adult Regular)  Pulse 72  Temp 97.9  F (36.6  C) (Oral)   Resp 14  Ht 5' 4\" (1.626 m)  Wt 185 lb 2 oz (84 kg)  BMI 31.78 kg/m2  General appearance: alert, appears stated age and cooperative  Ears: normal TM's and external ear canals both ears  Nose: Nares normal. Septum midline. Mucosa normal. No drainage or sinus tenderness.  Throat: lips, mucosa, and tongue normal; teeth and gums normal  Neck: " no adenopathy, no carotid bruit, no JVD, supple, symmetrical, trachea midline and thyroid not enlarged, symmetric, no tenderness/mass/nodules  Lungs: clear to auscultation bilaterally  Heart: regular rate and rhythm, S1, S2 normal, no murmur, click, rub or gallop  Extremities: extremities normal, atraumatic, no cyanosis or edema  Pulses: 2+ and symmetric  Skin: Skin color, texture, turgor normal. No rashes or lesions  Neurologic: Grossly normal      There are no Patient Instructions on file for this visit.    EKG:Normal sinus rhythm  nop acute changes  When compared with ECG of 16-MAY-2016 18:30,  No significant change was found    Labs:  Recent Results (from the past 48 hour(s))   Electrocardiogram Perform and Read    Collection Time: 03/09/18 11:06 AM   Result Value Ref Range    SYSTOLIC BLOOD PRESSURE  mmHg    DIASTOLIC BLOOD PRESSURE  mmHg    VENTRICULAR RATE 76 BPM    ATRIAL RATE 76 BPM    P-R INTERVAL 138 ms    QRS DURATION 74 ms    Q-T INTERVAL 368 ms    QTC CALCULATION (BEZET) 414 ms    P Axis 51 degrees    R AXIS 13 degrees    T AXIS 38 degrees    MUSE DIAGNOSIS       Normal sinus rhythm  nop acute changes  When compared with ECG of 16-MAY-2016 18:30,  No significant change was found  Confirmed by DIOGENES MORROW, LES LOC:JN ([id number]) on 3/9/2018 11:14:24 AM     Hemoglobin    Collection Time: 03/09/18 11:45 AM   Result Value Ref Range    Hemoglobin 13.3 12.0 - 16.0 g/dL        Immunization History   Administered Date(s) Administered     DT (pediatric) 01/01/1997, 06/06/2006     Hep A, historic 07/15/2009, 03/17/2010     Influenza high dose, seasonal 02/21/2018     Influenza, inj, historic,unspecified 10/09/2009     Pneumo Conj 13-V (2010&after) 10/21/2015     Pneumo Polysac 23-V 04/08/2014     Td,adult,historic,unspecified 06/06/2006     Tdap 06/08/2011     ZOSTER, LIVE 07/15/2009           Electronically signed by DEMETRICE Hutchison 03/09/18 10:51 AM

## 2021-06-17 NOTE — PATIENT INSTRUCTIONS - HE
Patient Instructions by Ally Hubbard CNP at 1/7/2019  3:10 PM     Author: Ally Hubbard CNP Service: -- Author Type: Nurse Practitioner    Filed: 1/7/2019  4:05 PM Encounter Date: 1/7/2019 Status: Addendum    : Ally Hubbard CNP (Nurse Practitioner)    Related Notes: Original Note by Ally Hubbard CNP (Nurse Practitioner) filed at 1/7/2019  4:05 PM         Patient Education     Bronchitis, Antibiotic Treatment (Adult)    Bronchitis is an infection of the air passages (bronchial tubes) in your lungs. It often occurs when you have a cold. This illness is contagious during the first few days and is spread through the air by coughing and sneezing, or by direct contact (touching the sick person and then touching your own eyes, nose, or mouth).  Symptoms of bronchitis include cough with mucus (phlegm) and low-grade fever. Bronchitis usually lasts 7 to 14 days. Mild cases can be treated with simple home remedies. More severe infection is treated with an antibiotic.  Home care  Follow these guidelines when caring for yourself at home:    If your symptoms are severe, rest at home for the first 2 to 3 days. When you go back to your usual activities, don't let yourself get too tired.    Do not smoke. Also avoid being exposed to secondhand smoke.    You may use over-the-counter medicines to control fever or pain, unless another medicine was prescribed. If you have chronic liver or kidney disease or have ever had a stomach ulcer or gastrointestinal bleeding, talk with your healthcare provider before using these medicines. Also talk to your provider if you are taking medicine to prevent blood clots. Aspirin should never be given to anyone younger than 18 years of age who is ill with a viral infection or fever. It may cause severe liver or brain damage.    Your appetite may be poor, so a light diet is fine. Avoid dehydration by drinking 6 to 8 glasses of fluids per day (such as water,  soft drinks, sports drinks, juices, tea, or soup). Extra fluids will help loosen secretions in the nose and lungs.    Over-the-counter cough, cold, and sore-throat medicines will not shorten the length of the illness, but they may be helpful to reduce symptoms. (Note: Do not use decongestants if you have high blood pressure.)    Finish all antibiotic medicine. Do this even if you are feeling better after only a few days.  Follow-up care  Follow up with your healthcare provider, or as advised. If you had an X-ray or ECG (electrocardiogram), a specialist will review it. You will be notified of any new findings that may affect your care.  If you are age 65 or older, or if you have a chronic lung disease or condition that affects your immune system, or you smoke, ask your healthcare provider about getting a pneumococcal vaccine and a yearly flu shot (influenza vaccine).  When to seek medical advice  Call your healthcare provider right away if any of these occur:    Fever of 100.4 F (38 C) or higher, or as directed by your healthcare provider    Coughing up increased amounts of colored sputum    Weakness, drowsiness, headache, facial pain, ear pain, or a stiff neck  Call 911  Call 911 if any of these occur.    Coughing up blood    Worsening weakness, drowsiness, headache, or stiff neck    Trouble breathing, wheezing, or pain with breathing  Date Last Reviewed: 9/13/2015 2000-2017 [employer]. [address] [place] [address], [place] [address]. All rights reserved. This information is not intended as a substitute for professional medical care. Always follow your healthcare professional's instructions.

## 2021-06-17 NOTE — PATIENT INSTRUCTIONS - HE
Patient Instructions by Merari Wesley MD at 5/7/2019  2:20 PM     Author: Merari Wesley MD Service: -- Author Type: Physician    Filed: 5/7/2019  3:08 PM Encounter Date: 5/7/2019 Status: Addendum    : Merari Wesley MD (Physician)    Related Notes: Original Note by Merari Wesley MD (Physician) filed at 5/7/2019  3:02 PM         Patient Education   Understanding USDA MyPlate  The USDA (US Department of Agriculture) has guidelines to help you make healthy food choices. These are called MyPlate. MyPlate shows the food groups that make up healthy meals using the image of a place setting. Before you eat, think about the healthiest choices for what to put onto your plate or into your cup or bowl. To learn more about building a healthy plate, visit www.choosemyplate.gov.       The Food Groups    Fruits: Any fruit or 100% fruit juice counts as part of the Fruit Group. Fruits may be fresh, canned, frozen, or dried, and may be whole, cut-up, or pureed. Make half your plate fruits and vegetables.    Vegetables: Any vegetable or 100% vegetable juice counts as a member of the Vegetable Group. Vegetables may be fresh, frozen, canned, or dried. They can be served raw or cooked and may be whole, cut-up, or mashed. Make half your plate fruits and vegetables.     Grains: All foods made from grains are part of the Grains Group. These include wheat, rice, oats, cornmeal, and barley such as bread, pasta, oatmeal, cereal, tortillas, and grits. Grains should be no more than a quarter of your plate. At least half of your grains should be whole grains.    Protein: This group includes meat, poultry, seafood, beans and peas, eggs, processed soy products (like tofu), nuts (including nut butters), and seeds. Make protein choices no more than a quarter of your plate. Meat and poultry choices should be lean or low fat.    Dairy: All fluid milk products and foods made from milk that contain calcium, like yogurt and cheese are part of the  Dairy Group. (Foods that have little calcium, such as cream, butter, and cream cheese, are not part of the group.) Most dairy choices should be low-fat or fat-free.    Oils: These are fats that are liquid at room temperature. They include canola, corn, olive, soybean, and sunflower oil. Foods that are mainly oil include mayonnaise, certain salad dressings, and soft margarines. You should have only 5 to 7 teaspoons of oils a day. You probably already get this much from the food you eat.  Use RUNform to Help Build Your Meals  The ????cker can help you plan and track your meals and activity. You can look up individual foods to see or compare their nutritional value. You can get guidelines for what and how much you should eat. You can compare your food choices. And you can assess personal physical activities and see ways you can improve. Go to www.[employer].gov/NERIcker/.    8264-9245 The [employer]. [address] [place] [address]. All rights reserved. This information is not intended as a substitute for professional medical care. Always follow your healthcare professional's instructions.           Patient Education   Urinary Incontinence, Female (Adult)  Urinary incontinence means loss of control of the bladder. This problem affects many women, especially as they get older. If you have incontinence, you may be embarrassed to ask for help. But know that this problem can be treated.  Types of Incontinence  There are different types of incontinence. Two of the main types are described here. You can have more than one type.    Stress incontinence. With this type, urine leaks when pressure (stress) is put on the bladder. This may happen when you cough, sneeze, or laugh. Stress incontinence most often occurs because the pelvic floor muscles that support the bladder and urethra are weak. This can happen after pregnancy and vaginal childbirth or a hysterectomy. It can also be due to  excess body weight or hormone changes.    Urge incontinence (also called overactive bladder). With this type, a sudden urge to urinate is felt often. This may happen even though there may not be much urine in the bladder. The need to urinate often during the night is common. Urge incontinence most often occurs because of bladder spasms. This may be due to bladder irritation or infection. Damage to bladder nerves or pelvic muscles, constipation, and certain medicines can also lead to urge incontinence.  Treatment of urinary incontinence depends on the cause. Further evaluation is needed to find the type you have. This will likely include an exam and certain tests. Based on the results, you and your healthcare provider can then plan treatment. Until a diagnosis is made, the home care tips below can help relieve symptoms.  Home care    Do pelvic floor muscle exercises, if they are prescribed. The pelvic floor muscles help support the bladder and urethra. Many women find that their symptoms improve when doing special exercises that strengthen these muscles. To do the exercises contract the muscles you would use to stop your stream of urine, but do this when youre not urinating. Hold for 10 seconds, then relax. Repeat 10 to 20 times in a row, at least 3 times a day. Your provider may give you other instructions for how to do the exercises and how often.    Keep a bladder diary. This helps track how often and how much you urinate over a set period of time. Bring this diary with you to your next visit with the provider. The information can help your provider learn more about your bladder problem.    Lose weight, if advised to by your provider. Excess weight puts pressure on the bladder. Your provider can help you create a weight-loss plan thats right for you. This may include exercising more and making certain diet changes.    Don't consume foods and drinks that may irritate the bladder. These can include alcohol and  caffeinated drinks.    Quit smoking. Smoking and other tobacco use can lead to chronic cough that strains the pelvic floor muscles. Smoking may also damage the bladder and urethra. Talk with your provider about treatments or methods you can use to quit smoking.    If drinking large amounts of fluid causes you to have symptoms, you may be advised to limit your fluid intake. You may also be advised to drink most of your fluids during the day and to limit fluids at night.    If youre worried about urine leakage or accidents, you may wear absorbent pads to catch urine. Change the pads often. This helps reduce discomfort. It may also reduce the risk of skin or bladder infections.  Follow-up care  Follow up with your healthcare provider, or as directed. It may take some to find the right treatment for your problem. Your treatment plan may include special therapies or medicines. Certain procedures or surgery may also be options. Be sure to discuss any questions you have with your provider.  When to seek medical advice  Call the healthcare provider right away if any of these occur:    Fever of 100.4 F (38 C) or higher, or as directed by your provider    Bladder pain or fullness    Abdominal swelling    Nausea or vomiting    Back pain    Weakness, dizziness or fainting  Date Last Reviewed: 10/1/2017    1175-1400 [employer]. [address] [place] [address]. All rights reserved. This information is not intended as a substitute for professional medical care. Always follow your healthcare professional's instructions.       Advance Directive  Patients advance directive was discussed and I am comfortable with the patients wishes.  Patient Education   Personalized Prevention Plan  You are due for the preventive services outlined below.  Your care team is available to assist you in scheduling these services.  If you have already completed any of these items, please share that information with your care team  to update in your medical record.  Health Maintenance   Topic Date Due   ? ZOSTER VACCINES (2 of 3) 09/09/2009   ? MAMMOGRAM  03/29/2019   ? DXA SCAN  03/29/2019   ? FALL RISK ASSESSMENT  01/07/2020   ? COLONOSCOPY  07/01/2020   ? TD 18+ HE  06/08/2021   ? ADVANCE DIRECTIVES DISCUSSED WITH PATIENT  03/27/2024   ? PNEUMOCOCCAL POLYSACCHARIDE VACCINE AGE 65 AND OVER  Completed   ? INFLUENZA VACCINE RULE BASED  Completed   ? PNEUMOCOCCAL CONJUGATE VACCINE FOR ADULTS (PCV13 OR PREVNAR)  Completed       Patient Education     Understanding Restless Legs Syndrome    Are you ever annoyed by a creeping or itching feeling in your legs? Do you often feel an urge to move your legs while sitting or lying in bed? This can keep you from falling asleep at night. You may then feel tired during the day. If you have these problems, talk to your healthcare provider. He or she can suggest a treatment plan and help you find ways to sleep better.  Restless legs syndrome (RLS)  RLS is a creeping, crawly, or jumpy feeling in the legs with an urge to move them. Symptoms of RLS often occur during periods of inactivity, such as when you sit or lie down at night. This discomfort can keep you from falling asleep. RLS is more common in older people and tends to run in families. Overuse of caffeine or alcohol may make symptoms worse. Iron deficiency, diabetes, or kidney problems can contribute to RLS.  Periodic limb movement syndrome (PLMS)  PLMS is sudden, repetitive leg jerking during sleep. The person you sleep with is often the one who notices it. Your legs may jerk many times during the night. You and your partner may both have trouble sleeping and feel tired in the morning. PLMS shouldnt be confused with the normal leg or body twitching many people have when first falling asleep.  Treating these problems  If these problems are causing disrupted sleep and daytime symptoms, treatment may be needed. Possible treatments may include:    Avoiding  medicines like antidepressants, antinausea medicine, and other medicines that block dopamine    Iron supplements    Prescribed medicines including pramipexole, ropinirole, ritigotine, pregbalin, cabergoline, levodopa, and clonidine    Lifestyle changes, such as regular exercise, controlling caffeine intake, alcohol, and smoking  Date Last Reviewed: 9/1/2017 2000-2017 The [employer]. [address] [place] [address]. All rights reserved. This information is not intended as a substitute for professional medical care. Always follow your healthcare professional's instructions.

## 2021-06-20 NOTE — LETTER
Letter by Thanh Johnson MD at      Author: Thanh Johnson MD Service: -- Author Type: --    Filed:  Encounter Date: 1/23/2020 Status: (Other)         Kelly Campbell  [address]  [facility] [address]             January 23, 2020         Dear Ms. Campbell,    Below are the results from your recent visit:    Resulted Orders   Lipid [place] FASTING   Result Value Ref Range    Cholesterol 253 (H) <=199 mg/dL    Triglycerides 254 (H) <=149 mg/dL    HDL Cholesterol 46 (L) >=50 mg/dL    LDL Calculated 156 (H) <=129 mg/dL    Patient Fasting > 8hrs? Yes    Comprehensive Metabolic Panel   Result Value Ref Range    Sodium 141 136 - 145 mmol/L    Potassium 4.7 3.5 - 5.0 mmol/L    Chloride 106 98 - 107 mmol/L    CO2 24 22 - 31 mmol/L    Anion Gap, Calculation 11 5 - 18 mmol/L    Glucose 94 70 - 125 mg/dL    BUN 22 8 - 28 mg/dL    Creatinine 0.83 0.60 - 1.10 mg/dL    GFR MDRD Af Amer >60 >60 mL/min/1.73m2    GFR MDRD Non Af Amer >60 >60 mL/min/1.73m2    Bilirubin, Total 0.3 0.0 - 1.0 mg/dL    Calcium 10.2 8.5 - 10.5 mg/dL    Protein, Total 7.0 6.0 - 8.0 g/dL    Albumin 3.7 3.5 - 5.0 g/dL    Alkaline Phosphatase 110 45 - 120 U/L    AST 23 0 - 40 U/L    ALT 20 0 - 45 U/L    Narrative    Fasting Glucose reference range is 70-99 mg/dL per  American Diabetes Association (ADA) guidelines.   Parathyroid Hormone Intact   Result Value Ref Range    PTH 62 10 - 86 pg/mL   Vitamin D, Total (25-Hydroxy)   Result Value Ref Range    Vitamin D, Total (25-Hydroxy) 29.3 (L) 30.0 - 80.0 ng/mL    Narrative    Deficiency <10.0 ng/mL  Insufficiency 10.0-29.9 ng/mL  Sufficiency 30.0-80.0 ng/mL  Toxicity (possible) >100.0 ng/mL       Oscar Mendoza:  Your Vit D is at the lower margin of normal.  You may consider taking an addition 400-800 units of Vit D daily.  The PTH and calcium level are NORMAL.  The cholesterol and LDL are mildly elevated (see below)  The remaining labs are  "NORMAL.  ++++++++++++++++++++++++++++++++++++++++++++++++++++++++++++++++++++++++++++++++++++++++++++++++++++++++++++++++++++++++++++++++++++++    HOW  TO LOWER YOUR CHOLESTEROL.    IT IS IMPORTANT TO ATTAIN AN LDL GOAL OF  <130 IN ORDER TO MINIMIZE RISK OF STROKE OR HEART ATTACK.  IF YOU HAVE DIABETES OR HEART DISEASE THEN THE LDL GOAL IS <100.       TIPS:    AVOID  FRIED FOODS    AVOID RED MEATS AND EAT LEAN CUTS WHEN YOU DO EAT THEM    PREFERRED MEATS WOULD BE SALMON OR CHICKEN    BUFFALO OR WILD GAME TEND TO BE LOWER IN SATURATED FATS    AVOID BAKERY ITEMS/ DESSERTS AS THESE ARE GENERALLY HIGH IN SATURATED FAT    AVOID OVEREATING OR EXTRA CALORIES.    AVOID DRESSINGS, SAUCES, GRAVIES, TOPPINGS AND CREAMS OR USE SPARINGLY    ICE CREAM (NEED I SAY MORE)    IF YOUR LDL (\"THE BAD STUFF\")  -160 THEN RECHECK A FASTING CHOLESTEROL PANEL IN ONE YEAR.    IF YOUR LDL -190 THEN RECHECK A FASTING CHOLESTEROL PANEL IN 6 MONTHS.    IF YOUR LDL IS >190 THEN REPEAT FASTING LABS IN 3 MONTHS AND SCHEDULE A FOLLOW UP APPOINTMENT.    IDEALLY YOUR HDL CHOLESTEROL (\"THE GOOD STUFF\") SHOULD BE ABOVE 40.  IF IT IS LOW IT MAY COME UP WITH EXERCISE.     Please call with questions or contact us using [employer].    Sincerely,        Electronically signed by Thanh Johnson MD       "

## 2021-06-20 NOTE — LETTER
"Letter by Thanh Johnson MD at      Author: Thanh Johnson MD Service: -- Author Type: --    Filed:  Encounter Date: 12/12/2019 Status: Signed         Kelly Campbell  [address]  [facility] [address]             December 12, 2019         Dear Ms. Campbell,    Below are the results from your recent visit:    Resulted Orders   Lipid [place] FASTING   Result Value Ref Range    Cholesterol 220 (H) <=199 mg/dL    Triglycerides 116 <=149 mg/dL    HDL Cholesterol 51 >=50 mg/dL    LDL Calculated 146 (H) <=129 mg/dL    Patient Fasting > 8hrs? Yes    Comprehensive Metabolic Panel   Result Value Ref Range    Sodium 142 136 - 145 mmol/L    Potassium 4.5 3.5 - 5.0 mmol/L    Chloride 107 98 - 107 mmol/L    CO2 25 22 - 31 mmol/L    Anion Gap, Calculation 10 5 - 18 mmol/L    Glucose 93 70 - 125 mg/dL    BUN 22 8 - 28 mg/dL    Creatinine 0.96 0.60 - 1.10 mg/dL    GFR MDRD Af Amer >60 >60 mL/min/1.73m2    GFR MDRD Non Af Amer 57 (L) >60 mL/min/1.73m2    Bilirubin, Total 0.3 0.0 - 1.0 mg/dL    Calcium 10.1 8.5 - 10.5 mg/dL    Protein, Total 7.1 6.0 - 8.0 g/dL    Albumin 4.0 3.5 - 5.0 g/dL    Alkaline Phosphatase 88 45 - 120 U/L    AST 22 0 - 40 U/L    ALT 18 0 - 45 U/L    Narrative    Fasting Glucose reference range is 70-99 mg/dL per  American Diabetes Association (ADA) guidelines.       Oscar Mendoza:  Your cholesterol panel looks good.  In particular the triglycerides are excellent.  We will plan on repeating the lipids after your one month \"holiday\" off fenofibrate.    Your very slightly diminished kidney function has remained stable.  Avoid use of any anti-inflammatory (advil, ibuprofen, motrin, aleve, excedrin, or aspirin (in excess of 81 mg daily))    The remaining labs are NORMAL.        Please call with questions or contact us using [employer].    Sincerely,        Electronically signed by Thanh Johnson MD       "

## 2021-06-20 NOTE — LETTER
Letter by Thanh Johnson MD at      Author: Thanh Johnson MD Service: -- Author Type: --    Filed:  Encounter Date: 2/21/2020 Status: (Other)         Kelly MISHEL Crystalsourav  [address]  [facility] [address]             February 21, 2020         Dear Ms. Campbell,    Your COLOGUARD testing from 2-12-20 is NEGATIVE.  It should be repeated in 3 years.        Please call with questions or contact us using [employer].    Sincerely,        Electronically signed by Thanh Johnson MD

## 2021-06-20 NOTE — LETTER
Letter by Thanh Johnson MD at      Author: Thanh Johnson MD Service: -- Author Type: --    Filed:  Encounter Date: 12/21/2019 Status: Signed         Kelly Campbell  [address]  [facility] [address]             December 21, 2019         Dear Ms. Adrian,    Below are the results from your recent visit:    Resulted Orders   DXA Bone Density Scan    Narrative    12/18/2019      RE: Kelly Adrian  YOB: 1950        Dear Thanh Johnson,    Patient Profile:  69 y.o. female, postmenopausal, is here for the follow up bone density   test.   History of fractures - None. Family history of osteoporosis - Yes;    sibling.  Family history of hip fracture: None. Smoking history - No.   Osteoporosis treatment past -  Yes;  HRT. Osteoporosis treatment current -   No.  Chronic medical problems - Oophorectomy. High risk medications -    None.      Assessment:    1. The spine bone density L1-L3 with T-score -2.1.  2. Femoral bone densities show left femoral neck T- score -2.1 and right   total hip T-score -2.1.  3. Trabecular bone score indicates moderate trabecular bone architecture.      69 y.o. female with LOW BONE DENSITY (OSTEOPENIA) and MODERATE fracture   risk, adjusted for the TBS, with major osteoporotic fracture risk 12.0 %   and hip fracture risk 2.5 %.     Since the previous bone density dated  March 29, 2017, there has been a     -13.6 % change in the bone density of the spine.  Additionally there has   been a -4.8 % change in the left total hip and a -4.6 % change in the   right total hip.    Recommendations:  Appropriate calcium, vitamin D supplements, along with balance and weight   bearing exercise recommended.  Additionally, because of the significant   loss seen since 2017, consideration for a metabolic evaluation is   recommended as well, with follow up bone density scan in 2 years.      Bone densitometry was performed on your patient using our [employer]    densitometer. The results are summarized and a copy of the actual scans   are included for your review. In conformity with the International Society   of Clinical Densitometry's most recent position statement for DXA   interpretation (2015), the diagnosis will be made on the lowest measured   T-score of the lumbar spine, femoral neck, total proximal femur or 33%   radius. Note the change in terminology for diagnostic classification from   OSTEOPENIA to LOW BONE MASS. All trending for sequential exams will be   done using multiple vertebrae or the total proximal femur. Fracture risk   is based on the WHO Fracture Risk Assessment Tool (FRAX). If additional   information is needed or if you would like to discuss the results, please   do not hesitate to call me.       Thank you for referring this patient to [facility].   We are happy to be of service in support of you and your practice. If you   have any questions or suggestions to improve our service, please call me   at [phone].     Sincerely,     Supriya Chau M.D. [employer].  Osteoporosis Services, [facility]       Oscar Mendoza:  Please set up an appt to discuss doing some further blood work to investigate your decline in bone mass.   We can also discuss the medications available to help increase bone strength.    Please call with questions or contact us using [employer].    Sincerely,        Electronically signed by Thanh Johnson MD

## 2021-06-22 NOTE — PROGRESS NOTES
Assessment:     1. Acute bronchitis with symptoms greater than 10 days  ipratropium-albuterol 0.5-2.5 mg/3 mL nebulizer solution 3 mL (DUO-NEB)    benzonatate (TESSALON) 200 MG capsule    predniSONE (DELTASONE) 20 MG tablet    albuterol (PROAIR HFA;PROVENTIL HFA;VENTOLIN HFA) 90 mcg/actuation inhaler    amoxicillin-clavulanate (AUGMENTIN) 875-125 mg per tablet    DISCONTINUED: ipratropium-albuterol (DUO-NEB) 0.5-2.5 mg/3 mL nebulizer          Plan:     Differential diagnosis include but not limited to upper respiratory infection, pneumonia, acute bronchitis.  Discussed with the patient on exam, patient does have some minimal wheezing but no rhonchi noted.  I do not think she has pneumonia, therefore no images recommended for today's visit.  DuoNeb given for constant cough especially during the visit.  Reevaluation patient reports that she is feeling much better, she feels like she is able to breathe.  We will treat this as a bacterial bronchitis with Augmentin twice daily times 10 days, albuterol inhaler every 6 hours for shortness of breath or cough as needed, Tessalon Perles 3 times daily as needed for cough.  Advised patient to use ibuprofen or Tylenol for cough or fever.  Monitor for worsening symptoms.  Follow-up with a PCP if symptoms does not resolve after treatment.  Patient verbalized understanding the plan of care.    Subjective:       68 y.o. female presents for evaluation of a cough that has been going on and off for about 2-1/2 weeks.  She reports that a few days ago she thought that her cough had resolved and then she noticed that she is coughing more than before.  Her cough is getting worse, is nonproductive, is dry.  She denies any sore throat, fever, a few nights she felt sweaty but was afebrile.  She denies nausea, vomiting, diarrhea, or shortness of breath.  She has been exposed to some people who had similar symptoms but the symptoms resolved and she continues to have her symptoms.  She has been  using Robitussin-DM and Mucinex which gave her minimal symptom relief.    The following portions of the patient's history were reviewed and updated as appropriate: allergies, current medications, past family history, past medical history, past social history, past surgical history and problem list.    Review of Systems  A 12 point comprehensive review of systems was negative except as noted.      Objective:      /70 (Patient Site: Right Arm, Patient Position: Sitting, Cuff Size: Adult Regular)   Pulse 81   Temp 98.2  F (36.8  C) (Oral)   Resp 13   Wt 184 lb (83.5 kg)   SpO2 97%   Breastfeeding? No   BMI 31.58 kg/m    General appearance: alert, appears stated age, cooperative and moderate distress  Head: Normocephalic, without obvious abnormality, atraumatic, sinuses nontender to percussion  Eyes: conjunctivae/corneas clear. PERRL, EOM's intact. Fundi benign.  Ears: abnormal TM right ear - bulging and air-fluid level and abnormal TM left ear - bulging and air-fluid level  Nose: Nares normal. Septum midline. Mucosa normal. No drainage or sinus tenderness., purulent discharge, moderate congestion  Throat: abnormal findings: marked oropharyngeal erythema  Lungs: wheezes bilaterally  Heart: regular rate and rhythm, S1, S2 normal, no murmur, click, rub or gallop  Extremities: extremities normal, atraumatic, no cyanosis or edema  Pulses: 2+ and symmetric  Skin: Skin color, texture, turgor normal. No rashes or lesions  Lymph nodes: Cervical, supraclavicular, and axillary nodes normal.  Neurologic: Grossly normal     This note has been dictated using voice recognition software. Any grammatical or context distortions are unintentional and inherent to the software

## 2021-06-23 NOTE — TELEPHONE ENCOUNTER
Patient previously requested this from the pharmacy. Nothing noted in chart from pharmacy yet.  Refill Request  Did you contact pharmacy: Yes  Medication name:   Requested Prescriptions     Pending Prescriptions Disp Refills     fenofibrate (TRICOR) 54 MG tablet 90 tablet 3     Sig: Take 1 tablet (54 mg total) by mouth daily.     Who prescribed the medication: Merari Wesley MD  Pharmacy Name and Location: [facility]   Is patient out of medication: No   Patient notified refills processed in 72 hours:  yes  Okay to leave a detailed message: yes

## 2021-06-23 NOTE — TELEPHONE ENCOUNTER
RN cannot approve Refill Request    RN can NOT refill this medication PCP messaged that patient is overdue for Labs. And ov      Tracy Hebert, [facility] Triage/Med Refill 2/6/2019    Requested Prescriptions   Pending Prescriptions Disp Refills     fenofibrate (TRICOR) 54 MG tablet 90 tablet 3     Sig: Take 1 tablet (54 mg total) by mouth daily.    Fenofibrate Refill Protocol Failed - 2/6/2019  4:10 PM       Failed - Renal status in last 6 months    Creatinine   Date Value Ref Range Status   02/21/2018 0.82 0.60 - 1.10 mg/dL Final            Passed - Fasting lipid cascade in last 12 months    Cholesterol   Date Value Ref Range Status   09/26/2018 196 <=199 mg/dL Final     Triglycerides   Date Value Ref Range Status   09/26/2018 129 <=149 mg/dL Final     HDL Cholesterol   Date Value Ref Range Status   09/26/2018 52 >=50 mg/dL Final     LDL Calculated   Date Value Ref Range Status   09/26/2018 118 <=129 mg/dL Final     Patient Fasting > 8hrs?   Date Value Ref Range Status   09/26/2018 Yes  Final            Passed - AST or ALT in last 12 months    AST   Date Value Ref Range Status   09/26/2018 20 0 - 40 U/L Final     ALT   Date Value Ref Range Status   09/26/2018 19 0 - 45 U/L Final              Passed - PCP or prescribing provider visit in past 12 months      Last office visit with prescriber/PCP: Visit date not found OR same dept: Visit date not found OR same specialty: 2/3/2017 Mackenzie Venegas, [facility]  Last physical: 2/21/2018 Last MTM visit: Visit date not found   Next visit within 3 mo: Visit date not found  Next physical within 3 mo: Visit date not found  Prescriber OR PCP: Merari Wesley MD  Last diagnosis associated with med order: There are no diagnoses linked to this encounter.  If protocol passes may refill for 12 months if within 3 months of last provider visit (or a total of 15 months).

## 2021-06-25 NOTE — PROGRESS NOTES
Preoperative Exam    Scheduled Procedure: LEFT TOTAL KNEE ARTHROPLASTY  Surgery Date:  3/27/19  Surgery Location: [facility], fax [phone]    Surgeon:  Dr. Blas    Assessment/Plan:     1. Encounter for preoperative examination for general surgical procedure  See patient instructions.  Patient cleared for surgery without restriction--to take blood pressure pill with sip of water a.m. of surgery.  Surgical Procedure Risk: Intermediate (reported cardiac risk generally 1-5%)  Have you had prior anesthesia?: Yes  Have you or any family members had a previous anesthesia reaction:  No  Do you or any family members have a history of a clotting or bleeding disorder?: No  Cardiac Risk Assessment: no increased risk for major cardiac complications    Patient approved for surgery with general or local anesthesia.    Functional Status: Independent  Patient plans to recover at home with family.    - Hemoglobin  - Electrocardiogram Perform - Clinic    2. Localized Osteoarthritis Of The Knee  As above.    3. Benign essential HTN  Blood pressure well controlled with current atenolol.  Check renal panel today.  - Renal Function Profile    4. Hypercholesteremia  Patient on fenofibrate-- lipids reviewed from 9/18 and up-to-date.    5. Obesity (BMI 30-39.9)  Flowsheet reviewed--weight generally stable (up 5 pounds from a year ago).  Encourage patient to monitor diet/portion control and increase activity as tolerated postoperatively with goal of optimizing weight.    6. Urinary frequency  Low likelihood of urinary tract infection but with upcoming surgery/new symptoms, check UA UC today.  - Urinalysis-UC if Indicated  - Culture, Urine    7.  Health maintenance.  All health maintenance reviewed.  Patient did do seasonal flu shot this year at [facility].  Her last mammogram 3/17 and would recommend repeat--referral completed.  Patient also advised to look into coverage with her insurance for new shingles vaccine.  Healthcare directive  on file.           Subjective:      Kelly Campbell is a 68 y.o. female who presents for a preoperative consultation.  Patient scheduled for total knee arthroplasty on the left.  She has previous history of right total knee arthroplasty 2012.  She had revision 2016 secondary to development of sepsis.  Patient is taking daily NSAIDs currently for pain associated with osteoarthritis of the joint and is significantly limited in her activity due to pain in the knee.  Patient has attended joint class and is been feeling prepared for upcoming surgery.    She has a history of hypertension.  This is been well controlled with a low-dose of beta-blocker he denies any chest pain, shortness of breath, lower extremity edema, headaches, or vision changes.  She also has elevated cholesterol for which she takes fenofibrate.  Labs are up-to-date.  She reports being well with no recent fevers or illness.  Kelly does admit to some history of urinary frequency--a change from baseline.  No dysuria, hematuria, back pain, fever.  No recent UTI.  No incontinence.  Patient last had surgery 3/18 for bilateral salpingo-oophorectomy for ovarian mass that was benign.  No complications with surgery/anesthesia.    All other systems reviewed and are negative, other than those listed in the HPI.    Pertinent History  Do you have difficulty breathing or chest pain after walking up a flight of stairs: No  History of obstructive sleep apnea: No  Steroid use in the last 6 months: No  Frequent Aspirin/NSAID use: No  Prior Blood Transfusion: No  Prior Blood Transfusion Reaction: No  If for some reason prior to, during or after the procedure, if it is medically indicated, would you be willing to have a blood transfusion?:  There is no transfusion refusal.    Current Outpatient Medications   Medication Sig Dispense Refill     acetaminophen-caffeine (EXCEDRIN) 500-65 mg Tab per tablet Take 1-2 tablets by mouth daily as needed.              atenolol  (TENORMIN) 50 MG tablet Take 1 tablet (50 mg total) by mouth daily. 90 tablet 3     calcium-vitamin D 500 mg(1,250mg) -200 unit per tablet Take 1 tablet by mouth daily.       FA/mv,Ca,iron,min/lycopene/lut (MULTIVITAL ORAL) Take 1 tablet by mouth daily.       fenofibrate (TRICOR) 54 MG tablet Take 1 tablet (54 mg total) by mouth daily. 90 tablet 3     ibuprofen (ADVIL,MOTRIN) 600 MG tablet Take 600 mg by mouth every 6 (six) hours as needed for pain.       loratadine (CLARITIN) 10 mg tablet Take 10 mg by mouth daily.       omeprazole (PRILOSEC) 20 MG capsule Take 20 mg by mouth daily.       No current facility-administered medications for this visit.         Allergies   Allergen Reactions     Iodine      Tolerates topical iodine     Ciprofloxacin Itching     Was itchy all over with cipro infusion.  Went away when infusion was stopped.     Hydromorphone Itching     Does not recall having reaction, except had around same time as Cipro with allergy.        Patient Active Problem List   Diagnosis     Benign essential HTN     Motion Sickness     Localized Osteoarthritis Of The Knee     Colitis     Osteopenia     Rhinitis     Obesity (BMI 30-39.9)     Hypercholesteremia     Stress incontinence of urine     Shoulder impingement, right       Past Medical History:   Diagnosis Date     Hypertension      Osteopenia        Past Surgical History:   Procedure Laterality Date     BILATERAL SALPINGOOPHORECTOMY  03/2018    CAMILA; Dr Samuel   path=benign R ovarian fibroma     INCISION AND DRAINAGE OF WOUND Right 5/17/2016    Procedure: INCISION DRAINAGE AND  DEBRIDEMENT OF RIGHT KNEE AND RIGHT TIBIAL INSERT REVISION;  Surgeon: Castillo Blas MD;  Location: [facility];  Service:      OR APPENDECTOMY      Description: Appendectomy;  Recorded: 07/15/2009;     OR LIGATE FALLOPIAN TUBE      Description: Tubal Ligation;  Recorded: 07/15/2009;     OR REVISE KNEE JOINT REPLACE,ALL PARTS Right 5/17/2016    Procedure: RIGHT TIBIAL  INSERT REVISION;  Surgeon: Castillo Blas MD;  Location: [facility];  Service: Orthopedics     NH TOTAL KNEE ARTHROPLASTY      Description: Total Knee Arthroplasty;  Recorded: 04/20/2012;  Comments: 4/12---some post op anemia; Performed by Dr. Gresham with [facility]       Social History     Socioeconomic History     Marital status:      Spouse name: Not on file     Number of children: Not on file     Years of education: Not on file     Highest education level: Not on file   Occupational History     Not on file   Social Needs     Financial resource strain: Not on file     Food insecurity:     Worry: Not on file     Inability: Not on file     Transportation needs:     Medical: Not on file     Non-medical: Not on file   Tobacco Use     Smoking status: Never Smoker     Smokeless tobacco: Never Used   Substance and Sexual Activity     Alcohol use: Not on file     Comment: 2-3 drinks a year     Drug use: No     Sexual activity: Not on file   Lifestyle     Physical activity:     Days per week: Not on file     Minutes per session: Not on file     Stress: Not on file   Relationships     Social connections:     Talks on phone: Not on file     Gets together: Not on file     Attends Yarsanism service: Not on file     Active member of club or organization: Not on file     Attends meetings of clubs or organizations: Not on file     Relationship status: Not on file     Intimate partner violence:     Fear of current or ex partner: Not on file     Emotionally abused: Not on file     Physically abused: Not on file     Forced sexual activity: Not on file   Other Topics Concern     Not on file   Social History Narrative    05/16/16 - Patient resides with her .        Patient Care Team:  Merari Wesley MD as PCP - Castillo Paredes MD as Physician (Orthopedic Surgery)          Objective:     Vitals:    03/19/19 1458   BP: 132/83   Pulse: 77   Resp: 16   Temp: (!) 96.4  F (35.8  C)   TempSrc: Oral    Weight: 190 lb 12.8 oz (86.5 kg)         Physical Exam:      General Appearance:    Alert, cooperative, no distress, appears stated age   Head:    Normocephalic, without obvious abnormality, atraumatic   Eyes:    PERRL, conjunctiva/corneas clear, EOM's intact both eyes   Ears:    Normal TM's and external ear canals, both ears   Throat:   Lips, mucosa, and tongue normal; teeth and gums normal   Neck:   Supple, symmetrical, trachea midline, no adenopathy;     thyroid:  no enlargement/tenderness/nodules; no carotid    bruit or JVD   Back:     Symmetric, no curvature, no CVA tenderness   Lungs:     Clear to auscultation bilaterally, respirations unlabored   Chest Wall:    No tenderness or deformity    Heart:    Regular rate and rhythm, S1 and S2 normal, no murmur, rub   or gallop   Breast Exam:   Deferred   Abdomen:     Soft, non-tender, bowel sounds active all four quadrants,     no masses, no organomegaly   Genitalia:   Deferred   Rectal:   Deferred   Extremities:   Extremities normal, atraumatic, no cyanosis or edema   Pulses:   2+ and symmetric all extremities   Skin:   Skin color, texture, turgor normal, no rashes or lesions   Neurologic:   CNII-XII intact             Patient Instructions     Hold all supplements, aspirin and NSAIDs for 7 days prior to surgery.  Follow your surgeon's direction on when to stop eating and drinking prior to surgery.  Your surgeon will be managing your pain after your surgery.    Remove all jewelry and metal piercings before your surgery.   Remove nail polish from fingers before surgery.    To do:  Check with insurance re: SHINGRIX (new shingles vaccine)  Schedule mammogram      EKG: Normal sinus rhythm with no significant change when compared with previous done 3/9/18.  Final read per cardiology (below)    Labs:  Recent Results (from the past 24 hour(s))   Electrocardiogram Perform - Clinic    Collection Time: 03/19/19  3:07 PM   Result Value Ref Range    SYSTOLIC BLOOD PRESSURE   mmHg    DIASTOLIC BLOOD PRESSURE  mmHg    VENTRICULAR RATE 71 BPM    ATRIAL RATE 71 BPM    P-R INTERVAL 144 ms    QRS DURATION 78 ms    Q-T INTERVAL 392 ms    QTC CALCULATION (BEZET) 425 ms    P Axis 50 degrees    R AXIS 29 degrees    T AXIS 42 degrees    MUSE DIAGNOSIS       Normal sinus rhythm  Normal ECG  When compared with ECG of 09-MAR-2018 11:06,  No significant change was found     Hemoglobin    Collection Time: 03/19/19  3:53 PM   Result Value Ref Range    Hemoglobin 12.6 12.0 - 16.0 g/dL   Urinalysis-UC if Indicated    Collection Time: 03/19/19  4:04 PM   Result Value Ref Range    Color, UA Yellow Colorless, Yellow, Straw, Light Yellow    Clarity, UA Clear Clear    Glucose, UA Negative Negative    Bilirubin, UA Negative Negative    Ketones, UA Trace (!) Negative    Specific Gravity, UA >=1.030 1.005 - 1.030    Blood, UA Negative Negative    pH, UA 5.0 5.0 - 8.0    Protein, UA Negative Negative mg/dL    Urobilinogen, UA 0.2 E.U./dL 0.2 E.U./dL, 1.0 E.U./dL    Nitrite, UA Negative Negative    Leukocytes, UA Trace (!) Negative    Bacteria, UA Few (!) None Seen hpf    RBC, UA 0-2 None Seen, 0-2 hpf    WBC, UA 5-10 (!) None Seen, 0-5 hpf    Squam Epithel, UA 0-5 None Seen, 0-5 lpf       Immunization History   Administered Date(s) Administered     DT (pediatric) 01/01/1997, 06/06/2006     Hep A, historic 07/15/2009, 03/17/2010     Influenza high dose, seasonal 02/21/2018     Influenza, inj, historic,unspecified 10/09/2009     Pneumo Conj 13-V (2010&after) 10/21/2015     Pneumo Polysac 23-V 04/08/2014     Td,adult,historic,unspecified 06/06/2006     Tdap 06/08/2011     ZOSTER, LIVE 07/15/2009           Electronically signed by Merari Wesley MD 03/19/19 2:41 PM

## 2021-06-25 NOTE — PATIENT INSTRUCTIONS - HE
Hold all supplements, aspirin and NSAIDs for 7 days prior to surgery.  Follow your surgeon's direction on when to stop eating and drinking prior to surgery.  Your surgeon will be managing your pain after your surgery.    Remove all jewelry and metal piercings before your surgery.   Remove nail polish from fingers before surgery.    To do:  Check with insurance re: SHINGRIX (new shingles vaccine)  Schedule mammogram

## 2021-07-03 NOTE — ADDENDUM NOTE
Addendum Note by Merari Granados MD at 3/5/2018 11:26 AM     Author: Merari Granados MD Service: -- Author Type: Physician    Filed: 3/5/2018 11:26 AM Encounter Date: 2/28/2018 Status: Signed    : Merari Granados MD (Physician)    Addended by: MERARI GRANADOS on: 3/5/2018 11:26 AM        Modules accepted: Orders

## 2021-07-03 NOTE — ADDENDUM NOTE
Addendum Note by Mimi Mcclure MD at 12/2/2020  8:36 PM     Author: Mimi Mcclure MD Service: -- Author Type: Physician    Filed: 12/2/2020  8:36 PM Encounter Date: 11/30/2020 Status: Signed    : Mimi Mcclure MD (Physician)    Addended by: MIMI MCCLURE on: 12/2/2020 08:36 PM        Modules accepted: Orders

## 2021-07-12 ENCOUNTER — OFFICE VISIT (OUTPATIENT)
Dept: FAMILY MEDICINE | Facility: CLINIC | Age: 71
End: 2021-07-12
Payer: COMMERCIAL

## 2021-07-12 VITALS
DIASTOLIC BLOOD PRESSURE: 72 MMHG | TEMPERATURE: 97.9 F | HEART RATE: 76 BPM | RESPIRATION RATE: 16 BRPM | BODY MASS INDEX: 35.03 KG/M2 | HEIGHT: 62 IN | SYSTOLIC BLOOD PRESSURE: 110 MMHG | WEIGHT: 190.38 LBS

## 2021-07-12 DIAGNOSIS — Z00.00 ENCOUNTER FOR MEDICARE ANNUAL WELLNESS EXAM: Primary | ICD-10-CM

## 2021-07-12 DIAGNOSIS — M25.571 PAIN IN JOINT, ANKLE AND FOOT, RIGHT: ICD-10-CM

## 2021-07-12 DIAGNOSIS — E55.9 HYPOVITAMINOSIS D: ICD-10-CM

## 2021-07-12 DIAGNOSIS — R05.9 COUGH: ICD-10-CM

## 2021-07-12 DIAGNOSIS — Z12.31 VISIT FOR SCREENING MAMMOGRAM: ICD-10-CM

## 2021-07-12 LAB
ALBUMIN SERPL-MCNC: 3.7 G/DL (ref 3.4–5)
ALP SERPL-CCNC: 96 U/L (ref 40–150)
ALT SERPL W P-5'-P-CCNC: 28 U/L (ref 0–50)
ANION GAP SERPL CALCULATED.3IONS-SCNC: 5 MMOL/L (ref 3–14)
AST SERPL W P-5'-P-CCNC: 25 U/L (ref 0–45)
BILIRUB SERPL-MCNC: 0.3 MG/DL (ref 0.2–1.3)
BUN SERPL-MCNC: 23 MG/DL (ref 7–30)
CALCIUM SERPL-MCNC: 9.5 MG/DL (ref 8.5–10.1)
CHLORIDE BLD-SCNC: 105 MMOL/L (ref 94–109)
CHOLEST SERPL-MCNC: 247 MG/DL
CO2 SERPL-SCNC: 31 MMOL/L (ref 20–32)
CREAT SERPL-MCNC: 1 MG/DL (ref 0.52–1.04)
FASTING STATUS PATIENT QL REPORTED: YES
GFR SERPL CREATININE-BSD FRML MDRD: 56 ML/MIN/1.73M2
GLUCOSE BLD-MCNC: 105 MG/DL (ref 70–99)
HDLC SERPL-MCNC: 52 MG/DL
LDLC SERPL CALC-MCNC: 142 MG/DL
NONHDLC SERPL-MCNC: 195 MG/DL
POTASSIUM BLD-SCNC: 3.9 MMOL/L (ref 3.4–5.3)
PROT SERPL-MCNC: 7.5 G/DL (ref 6.8–8.8)
SODIUM SERPL-SCNC: 141 MMOL/L (ref 133–144)
TRIGL SERPL-MCNC: 263 MG/DL

## 2021-07-12 PROCEDURE — 80053 COMPREHEN METABOLIC PANEL: CPT | Performed by: FAMILY MEDICINE

## 2021-07-12 PROCEDURE — 82306 VITAMIN D 25 HYDROXY: CPT | Performed by: FAMILY MEDICINE

## 2021-07-12 PROCEDURE — 36415 COLL VENOUS BLD VENIPUNCTURE: CPT | Performed by: FAMILY MEDICINE

## 2021-07-12 PROCEDURE — 90471 IMMUNIZATION ADMIN: CPT | Performed by: FAMILY MEDICINE

## 2021-07-12 PROCEDURE — 90715 TDAP VACCINE 7 YRS/> IM: CPT | Performed by: FAMILY MEDICINE

## 2021-07-12 PROCEDURE — 99397 PER PM REEVAL EST PAT 65+ YR: CPT | Mod: 25 | Performed by: FAMILY MEDICINE

## 2021-07-12 PROCEDURE — 99213 OFFICE O/P EST LOW 20 MIN: CPT | Mod: 25 | Performed by: FAMILY MEDICINE

## 2021-07-12 PROCEDURE — 80061 LIPID PANEL: CPT | Performed by: FAMILY MEDICINE

## 2021-07-12 PROCEDURE — 86803 HEPATITIS C AB TEST: CPT | Performed by: FAMILY MEDICINE

## 2021-07-12 RX ORDER — OMEPRAZOLE 40 MG/1
40 CAPSULE, DELAYED RELEASE ORAL DAILY
Qty: 14 CAPSULE | Refills: 0 | Status: SHIPPED | OUTPATIENT
Start: 2021-07-12 | End: 2021-12-07

## 2021-07-12 ASSESSMENT — ENCOUNTER SYMPTOMS
CHILLS: 0
SHORTNESS OF BREATH: 0
PARESTHESIAS: 1
DIZZINESS: 0
HEMATOCHEZIA: 0
HEARTBURN: 0
ABDOMINAL PAIN: 0
JOINT SWELLING: 0
HEADACHES: 1
SORE THROAT: 0
WEAKNESS: 0
FREQUENCY: 1
CONSTIPATION: 0
DIARRHEA: 0
EYE PAIN: 0
NAUSEA: 0
PALPITATIONS: 1
FEVER: 0
NERVOUS/ANXIOUS: 0
COUGH: 1
HEMATURIA: 0
BREAST MASS: 0
DYSURIA: 0
MYALGIAS: 0
ARTHRALGIAS: 1

## 2021-07-12 ASSESSMENT — ACTIVITIES OF DAILY LIVING (ADL): CURRENT_FUNCTION: NO ASSISTANCE NEEDED

## 2021-07-12 ASSESSMENT — MIFFLIN-ST. JEOR: SCORE: 1326.79

## 2021-07-12 NOTE — PATIENT INSTRUCTIONS
Patient Education   Personalized Prevention Plan  You are due for the preventive services outlined below.  Your care team is available to assist you in scheduling these services.  If you have already completed any of these items, please share that information with your care team to update in your medical record.  Health Maintenance Due   Topic Date Due     ANNUAL REVIEW OF HM ORDERS  Never done     Hepatitis C Screening  Never done     Zoster (Shingles) Vaccine (2 of 3) 09/09/2009     Annual Wellness Visit  05/07/2020     FALL RISK ASSESSMENT  05/07/2020     Diptheria Tetanus Pertussis (DTAP/TDAP/TD) Vaccine (3 - Td or Tdap) 06/08/2021

## 2021-07-12 NOTE — PROGRESS NOTES
"SUBJECTIVE:   Kelly Campbell is a 70 year old female who presents for Preventive Visit.    Patient has been advised of split billing requirements and indicates understanding: Yes   Are you in the first 12 months of your Medicare coverage?  No    Healthy Habits:     In general, how would you rate your overall health?  Good    Frequency of exercise:  None    Do you usually eat at least 4 servings of fruit and vegetables a day, include whole grains    & fiber and avoid regularly eating high fat or \"junk\" foods?  No    Taking medications regularly:  Yes    Medication side effects:  Lightheadedness    Ability to successfully perform activities of daily living:  No assistance needed    Home Safety:  No safety concerns identified    Hearing Impairment:  Difficulty understanding soft or whispered speech    In the past 6 months, have you been bothered by leaking of urine? Yes    In general, how would you rate your overall mental or emotional health?  Fair      PHQ-2 Total Score: 1    Additional concerns today:  Yes    Do you feel safe in your environment? Yes    Have you ever done Advance Care Planning? (For example, a Health Directive, POLST, or a discussion with a medical provider or your loved ones about your wishes): Yes, advance care planning is on file.    Fall risk  Fallen 2 or more times in the past year?: No  Any fall with injury in the past year?: No    Cognitive Screening   1) Repeat 3 items (Leader, Season, Table)    2) Clock draw: NORMAL  3) 3 item recall: Recalls 3 objects  Results: 3 items recalled: COGNITIVE IMPAIRMENT LESS LIKELY    Mini-CogTM Copyright DORON Cooper. Licensed by the author for use in [facility]; reprinted with permission (soeliazar@.[facility]). All rights reserved.      Do you have sleep apnea, excessive snoring or daytime drowsiness?: yes    Reviewed and updated as needed this visit by clinical staff  Tobacco  Allergies  Meds              Reviewed and updated as needed this visit by " "Provider                Social History     Tobacco Use     Smoking status: Never Smoker     Smokeless tobacco: Never Used   Substance Use Topics     Alcohol use: Not on file         Alcohol Use 7/12/2021   Prescreen: >3 drinks/day or >7 drinks/week? No           Current providers sharing in care for this patient include:   Patient Care Team:  Mimi Mcclure MD as PCP - General (Family Medicine)  Mimi Mcclure MD as Assigned PCP    The following health maintenance items are reviewed in Epic and correct as of today:  Health Maintenance Due   Topic Date Due     ANNUAL REVIEW OF HM ORDERS  Never done     HEPATITIS C SCREENING  Never done     ZOSTER IMMUNIZATION (2 of 3) 09/09/2009     FALL RISK ASSESSMENT  05/07/2020               Review of Systems   Constitutional: Negative for chills and fever.   HENT: Negative for congestion, ear pain, hearing loss and sore throat.    Eyes: Negative for pain and visual disturbance.   Respiratory: Positive for cough. Negative for shortness of breath.    Cardiovascular: Positive for palpitations. Negative for chest pain and peripheral edema.   Gastrointestinal: Negative for abdominal pain, constipation, diarrhea, heartburn, hematochezia and nausea.   Breasts:  Negative for tenderness, breast mass and discharge.   Genitourinary: Positive for frequency and urgency. Negative for dysuria, genital sores, hematuria, pelvic pain, vaginal bleeding and vaginal discharge.   Musculoskeletal: Positive for arthralgias. Negative for joint swelling and myalgias.   Skin: Negative for rash.   Neurological: Positive for headaches and paresthesias. Negative for dizziness and weakness.   Psychiatric/Behavioral: Negative for mood changes. The patient is not nervous/anxious.        OBJECTIVE:   /72   Pulse 76   Temp 97.9  F (36.6  C) (Tympanic)   Resp 16   Ht 1.575 m (5' 2\")   Wt 86.4 kg (190 lb 6 oz)   Breastfeeding No   BMI 34.82 kg/m   Estimated body mass index is 34.82 " "kg/m  as calculated from the following:    Height as of this encounter: 1.575 m (5' 2\").    Weight as of this encounter: 86.4 kg (190 lb 6 oz).  Physical Exam    Physical Exam:  General Appearance: Alert, cooperative, no distress, appears stated age   Head: Normocephalic, without obvious abnormality, atraumatic  Eyes: PERRL, conjunctiva/corneas clear, EOM's intact   Ears: Normal TM's and external ear canals, both ears  Nose:Nares normal, septum midline,mucosa normal, no drainage    Throat:Lips, mucosa, and tongue normal; teeth and gums normal  Neck: Supple, symmetrical, trachea midline, no adenopathy;  thyroid: not enlarged, symmetric, no tenderness/mass/nodules  Back: Symmetric, no curvature, ROM normal,  Lungs: Clear to auscultation bilaterally, respirations unlabored  Breasts: No breast masses, tenderness, asymmetry, or nipple discharge.  Heart: Regular rate and rhythm, S1 and S2 normal, no murmur, rub, or gallop  Abdomen: Soft, non-tender, bowel sounds active all four quadrants,  no masses, no organomegaly  Extremities: Extremities normal, atraumatic, no cyanosis or edema  Skin: Skin color, texture, turgor normal, no rashes or lesions  Lymph nodes: Cervical, supraclavicular, and axillary nodes normal and   Neurologic: Normal      Diagnostic Test Results:  Labs reviewed in Epic    ASSESSMENT / PLAN:   1. Encounter for Medicare annual wellness exam  - Lipid panel reflex to direct LDL Fasting; Future  - Comprehensive metabolic panel (BMP + Alb, Alk Phos, ALT, AST, Total. Bili, TP); Future  - Hepatitis C antibody; Future  - TDAP VACCINE (Adacel, Boostrix)  [[id number]]  - Lipid panel reflex to direct LDL Fasting  - Comprehensive metabolic panel (BMP + Alb, Alk Phos, ALT, AST, Total. Bili, TP)  - Hepatitis C antibody    2. Hypovitaminosis D  - Vitamin D Deficiency; Future  - Vitamin D Deficiency    3. Visit for screening mammogram  - *MA Screening Digital Bilateral; Future    4. Pain in joint, ankle and foot, " "right  Seems to be likely an Achilles tendinitis.  Recommended physical therapy orthopedics.  Patient would like a referral to orthopedics.  She believes that she prefers [facility].  - Orthopedic  Referral; Future    5. Cough  Seems to be due likely to either postnasal drip or reflux.  With a known history of reflux I will increase her omeprazole to 40 mg daily for 2 weeks.  If is not beneficial she will contact me at which point I can give her a referral to see the ENT physician.  She will also start taking a Claritin daily.    If her hoarseness of her voice does not improve would recommend ENT for a scope.  - omeprazole (PRILOSEC) 40 MG DR capsule; Take 1 capsule (40 mg) by mouth daily  Dispense: 14 capsule; Refill: 0    Patient has been advised of split billing requirements and indicates understanding: Yes  COUNSELING:  Reviewed preventive health counseling, as reflected in patient instructions    Estimated body mass index is 34.82 kg/m  as calculated from the following:    Height as of this encounter: 1.575 m (5' 2\").    Weight as of this encounter: 86.4 kg (190 lb 6 oz).    Weight management plan: Discussed healthy diet and exercise guidelines    She reports that she has never smoked. She has never used smokeless tobacco.      Appropriate preventive services were discussed with this patient, including applicable screening as appropriate for cardiovascular disease, diabetes, osteopenia/osteoporosis, and glaucoma.  As appropriate for age/gender, discussed screening for colorectal cancer, prostate cancer, breast cancer, and cervical cancer. Checklist reviewing preventive services available has been given to the patient.    Reviewed patients plan of care and provided an AVS. The Basic Care Plan (routine screening as documented in Health Maintenance) for Kelly meets the Care Plan requirement. This Care Plan has been established and reviewed with the Patient.    Counseling Resources:  ATP IV " Guidelines  Pooled Cohorts Equation Calculator  Breast Cancer Risk Calculator  Breast Cancer: Medication to Reduce Risk  FRAX Risk Assessment  ICSI Preventive Guidelines  Dietary Guidelines for Americans, 2010  [employer]'s MyPlate  ASA Prophylaxis  Lung CA Screening    Mimi Mcclure MD  [facility]    Identified Health Risks:

## 2021-07-13 ENCOUNTER — RECORDS - HEALTHEAST (OUTPATIENT)
Dept: ADMINISTRATIVE | Facility: CLINIC | Age: 71
End: 2021-07-13

## 2021-07-13 LAB
DEPRECATED CALCIDIOL+CALCIFEROL SERPL-MC: 76 UG/L (ref 20–75)
HCV AB SERPL QL IA: NONREACTIVE

## 2021-07-21 ENCOUNTER — RECORDS - HEALTHEAST (OUTPATIENT)
Dept: ADMINISTRATIVE | Facility: CLINIC | Age: 71
End: 2021-07-21

## 2021-07-21 ENCOUNTER — DOCUMENTATION ONLY (OUTPATIENT)
Dept: OTHER | Facility: CLINIC | Age: 71
End: 2021-07-21

## 2021-07-31 DIAGNOSIS — G25.81 RESTLESS LEGS SYNDROME: ICD-10-CM

## 2021-08-02 RX ORDER — PRAMIPEXOLE DIHYDROCHLORIDE 0.12 MG/1
TABLET ORAL
Qty: 90 TABLET | Refills: 1 | Status: SHIPPED | OUTPATIENT
Start: 2021-08-02 | End: 2021-11-26

## 2021-08-31 ENCOUNTER — ANCILLARY PROCEDURE (OUTPATIENT)
Dept: MAMMOGRAPHY | Facility: CLINIC | Age: 71
End: 2021-08-31
Attending: FAMILY MEDICINE
Payer: COMMERCIAL

## 2021-08-31 DIAGNOSIS — Z12.31 VISIT FOR SCREENING MAMMOGRAM: ICD-10-CM

## 2021-08-31 PROCEDURE — 77063 BREAST TOMOSYNTHESIS BI: CPT

## 2021-09-26 ENCOUNTER — HEALTH MAINTENANCE LETTER (OUTPATIENT)
Age: 71
End: 2021-09-26

## 2021-11-10 DIAGNOSIS — I10 BENIGN ESSENTIAL HYPERTENSION: ICD-10-CM

## 2021-11-11 RX ORDER — ATENOLOL 50 MG/1
TABLET ORAL
Qty: 90 TABLET | Refills: 2 | Status: SHIPPED | OUTPATIENT
Start: 2021-11-11 | End: 2022-08-08

## 2021-12-03 ENCOUNTER — TELEPHONE (OUTPATIENT)
Dept: FAMILY MEDICINE | Facility: CLINIC | Age: 71
End: 2021-12-03
Payer: COMMERCIAL

## 2021-12-03 NOTE — TELEPHONE ENCOUNTER
I can see her [id number] on Tuesday the seventh -that being said, if this represents shingles she would be better off to go to urgent care to have this more acutely treated as the treatment is most effective within the first few days of onset of rash.    EB

## 2021-12-03 NOTE — TELEPHONE ENCOUNTER
Patient reports that she has had a rash for one week. Rash is located on the left side of her back and under left arm. Rash on her back is the size of a silver dollar. Rash is itchy and painful at times. Pain takes her breath away at times, She has been using ibuprofen but not helping. She changed to tylenol and not helping at all. Patient is wanting an appointment with Dr. Mcclure only. First available spot if 12/7/21 at 11:40 AM per provider approval. Advised patient that there are no open appointments and should be seen in urgent care. Patient would like to wait to see if she can be seen by Dr. Mcclure. She did agree if symptoms worsen, she should be seen in the urgent care over the weekend. She will try to make an appointment with [facility]. Will route to provider to inform.  Mandy Holbrook RN

## 2021-12-07 ENCOUNTER — VIRTUAL VISIT (OUTPATIENT)
Dept: FAMILY MEDICINE | Facility: CLINIC | Age: 71
End: 2021-12-07
Payer: COMMERCIAL

## 2021-12-07 DIAGNOSIS — B02.9 HERPES ZOSTER WITHOUT COMPLICATION: Primary | ICD-10-CM

## 2021-12-07 PROCEDURE — 99213 OFFICE O/P EST LOW 20 MIN: CPT | Mod: 95 | Performed by: FAMILY MEDICINE

## 2021-12-07 RX ORDER — GABAPENTIN 100 MG/1
100-300 CAPSULE ORAL 3 TIMES DAILY PRN
Qty: 60 CAPSULE | Refills: 1 | Status: SHIPPED | OUTPATIENT
Start: 2021-12-07 | End: 2022-08-29

## 2021-12-07 RX ORDER — OXYCODONE HYDROCHLORIDE 5 MG/1
5 TABLET ORAL EVERY 6 HOURS PRN
Qty: 10 TABLET | Refills: 0 | Status: SHIPPED | OUTPATIENT
Start: 2021-12-07 | End: 2021-12-27

## 2021-12-07 RX ORDER — ACYCLOVIR 800 MG/1
TABLET ORAL
COMMUNITY
Start: 2021-12-04 | End: 2022-08-29

## 2021-12-07 NOTE — PROGRESS NOTES
Kelly is a 70 year old who is being evaluated via a billable telephone visit.      What phone number would you like to be contacted at? [phone]  How would you like to obtain your AVS? [employer]    --------------------------------    Assessment/Plan:    Kelly Campbell is a 70 year old female who is being evaluated remotely for:    Herpes zoster without complication  She will continue her acyclovir.  Gabapentin sent to the pharmacy and discussed the risks and benefits of the medication.  Limited oxycodone sent as well.    She will follow up with me via [employer] message in a few days to let me know how she is doing.  Discussed that sometimes it will take several weeks for the rash to dissipate fully but hopefully the medications will help in the meantime.  - gabapentin (NEURONTIN) 100 MG capsule  Dispense: 60 capsule; Refill: 1  - oxyCODONE (ROXICODONE) 5 MG tablet  Dispense: 10 tablet; Refill: 0        Medications Discontinued During This Encounter   Medication Reason     omeprazole (PRILOSEC) 40 MG DR capsule            Chief Complaint:  Follow Up        Subjective:   Kelly Campbell is a 70 year old female who is being evaluated via telephone visit today for follow-up shingles.    Patient had a rash that started about a week ago.  She was seen at the urgent care 3 days prior to this visit and diagnosed with shingles.  At that time her shingles was not overly painful.  It was continuing to spread so she was given Valtrex.  She is taking that 5 times daily and tolerating the medication well but states that she has still had a small amount of spread of the shingles.    She significant pain.  She notes that the rash is on her left breast and under her breast.  Also in her left armpit.  She is not been vaccinated for shingles        12 point review of systems completed and negative except for what has been described above    History   Smoking Status     Never Smoker   Smokeless Tobacco     Never Used         Current  Outpatient Medications:      acetaminophen-caffeine (EXCEDRIN TENSION HEADACHE) 500-65 MG TABS, Take 1-2 tablets by mouth, Disp: , Rfl:      acyclovir (ZOVIRAX) 800 MG tablet, , Disp: , Rfl:      amoxicillin (AMOXIL) 500 MG capsule, TAKE 4 CAPSULES BY MOUTH ONE HOUR BEFORE APPOINTMENT, Disp: , Rfl:      atenolol (TENORMIN) 50 MG tablet, TAKE 1 TABLET BY MOUTH EVERY DAY, Disp: 90 tablet, Rfl: 2     calcium carbonate-vitamin D (OYSTER SHELL CALCIUM/D) 500-200 MG-UNIT tablet, Take 1 tablet by mouth, Disp: , Rfl:      gabapentin (NEURONTIN) 100 MG capsule, Take 1-3 capsules (100-300 mg) by mouth 3 times daily as needed for neuropathic pain, Disp: 60 capsule, Rfl: 1     hydrochlorothiazide (HYDRODIURIL) 25 MG tablet, Take 25 mg by mouth daily, Disp: , Rfl:      omeprazole (PRILOSEC) 20 MG DR capsule, Take 20 mg by mouth, Disp: , Rfl:      oxyCODONE (ROXICODONE) 5 MG tablet, Take 1 tablet (5 mg) by mouth every 6 hours as needed for severe pain, Disp: 10 tablet, Rfl: 0     pramipexole (MIRAPEX) 0.125 MG tablet, TAKE 1 TABLET BY MOUTH AT BEDTIME., Disp: 90 tablet, Rfl: 2     albuterol (PROAIR HFA/PROVENTIL HFA/VENTOLIN HFA) 108 (90 Base) MCG/ACT inhaler, INHALE 2 PUFFS EVERY 4 HOURS AS NEEDED FOR SHORTNESS OF BREATH OR WHEEZING. (Patient not taking: Reported on 12/7/2021), Disp: , Rfl:      loratadine (CLARITIN) 10 MG tablet, Take 10 mg by mouth (Patient not taking: Reported on 12/7/2021), Disp: , Rfl:         Objective:  No vitals were done due to the remote nature of this visit    No flowsheet data found.              General: No acute distress, sounds well  Psych: Appropriate affect, pleasant  Pulmonary: Breathing comfortably, speaking in complete sentences     This note has been dictated and transcribed using voice recognition software.   Any errors in transcription are unintentional and inherent to the software.    Phone call duration:22 minutes

## 2022-08-08 DIAGNOSIS — I10 BENIGN ESSENTIAL HYPERTENSION: ICD-10-CM

## 2022-08-08 RX ORDER — ATENOLOL 50 MG/1
50 TABLET ORAL DAILY
Qty: 90 TABLET | Refills: 0 | Status: SHIPPED | OUTPATIENT
Start: 2022-08-08 | End: 2022-08-30

## 2022-08-08 NOTE — TELEPHONE ENCOUNTER
"Has appointment scheduled for 8/30/22    Requested Prescriptions   Pending Prescriptions Disp Refills     atenolol (TENORMIN) 50 MG tablet [Pharmacy Med Name: ATENOLOL 50 MG TABLET] 90 tablet 2     Sig: TAKE 1 TABLET BY MOUTH EVERY DAY       Beta-Blockers Protocol Failed - 8/8/2022 12:28 AM        Failed - Blood pressure under 140/90 in past 12 months     BP Readings from Last 3 Encounters:   07/12/21 110/72   12/15/20 138/84   11/04/20 (!) 151/87                 Passed - Patient is age 6 or older        Passed - Recent (12 mo) or future (30 days) visit within the authorizing provider's specialty     Patient has had an office visit with the authorizing provider or a provider within the authorizing providers department within the previous 12 mos or has a future within next 30 days. See \"Patient Info\" tab in inbasket, or \"Choose Columns\" in Meds & Orders section of the refill encounter.              Passed - Medication is active on med list             "

## 2022-08-09 DIAGNOSIS — I10 ESSENTIAL (PRIMARY) HYPERTENSION: ICD-10-CM

## 2022-08-09 RX ORDER — HYDROCHLOROTHIAZIDE 25 MG/1
TABLET ORAL
Qty: 30 TABLET | Refills: 0 | Status: SHIPPED | OUTPATIENT
Start: 2022-08-09 | End: 2022-08-30

## 2022-08-09 NOTE — TELEPHONE ENCOUNTER
Medication is being filled for 1 time refill only due to:  Patient needs labs And patient has 8/30/22 appt with Dr. Mcclure. .   Cotl Valverde RN

## 2022-08-23 ASSESSMENT — ENCOUNTER SYMPTOMS
JOINT SWELLING: 0
BREAST MASS: 0
SORE THROAT: 0
CONSTIPATION: 0
EYE PAIN: 0
HEMATURIA: 0
HEMATOCHEZIA: 0
HEARTBURN: 1
FEVER: 0
NAUSEA: 0
SHORTNESS OF BREATH: 0
HEADACHES: 1
MYALGIAS: 1
PARESTHESIAS: 0
DIARRHEA: 0
ABDOMINAL PAIN: 0
DYSURIA: 0
CHILLS: 0
ARTHRALGIAS: 0
FREQUENCY: 0
WEAKNESS: 0
COUGH: 1
DIZZINESS: 0

## 2022-08-23 ASSESSMENT — ACTIVITIES OF DAILY LIVING (ADL): CURRENT_FUNCTION: NO ASSISTANCE NEEDED

## 2022-08-28 ENCOUNTER — HEALTH MAINTENANCE LETTER (OUTPATIENT)
Age: 72
End: 2022-08-28

## 2022-08-30 ENCOUNTER — OFFICE VISIT (OUTPATIENT)
Dept: FAMILY MEDICINE | Facility: CLINIC | Age: 72
End: 2022-08-30
Payer: COMMERCIAL

## 2022-08-30 VITALS
BODY MASS INDEX: 36.1 KG/M2 | DIASTOLIC BLOOD PRESSURE: 78 MMHG | RESPIRATION RATE: 12 BRPM | HEIGHT: 62 IN | TEMPERATURE: 97.2 F | HEART RATE: 68 BPM | SYSTOLIC BLOOD PRESSURE: 132 MMHG | OXYGEN SATURATION: 96 % | WEIGHT: 196.19 LBS

## 2022-08-30 DIAGNOSIS — G25.81 RESTLESS LEGS SYNDROME: ICD-10-CM

## 2022-08-30 DIAGNOSIS — Z13.220 LIPID SCREENING: ICD-10-CM

## 2022-08-30 DIAGNOSIS — I10 BENIGN ESSENTIAL HYPERTENSION: ICD-10-CM

## 2022-08-30 DIAGNOSIS — Z96.652 STATUS POST TOTAL LEFT KNEE REPLACEMENT: ICD-10-CM

## 2022-08-30 DIAGNOSIS — Z00.00 ENCOUNTER FOR MEDICARE ANNUAL WELLNESS EXAM: Primary | ICD-10-CM

## 2022-08-30 DIAGNOSIS — I10 ESSENTIAL (PRIMARY) HYPERTENSION: ICD-10-CM

## 2022-08-30 DIAGNOSIS — L82.1 SEBORRHEIC KERATOSIS: ICD-10-CM

## 2022-08-30 DIAGNOSIS — E66.01 MORBID OBESITY (H): ICD-10-CM

## 2022-08-30 LAB
ALBUMIN SERPL BCG-MCNC: 4.1 G/DL (ref 3.5–5.2)
ALP SERPL-CCNC: 94 U/L (ref 35–104)
ALT SERPL W P-5'-P-CCNC: 20 U/L (ref 10–35)
ANION GAP SERPL CALCULATED.3IONS-SCNC: 11 MMOL/L (ref 7–15)
AST SERPL W P-5'-P-CCNC: 23 U/L (ref 10–35)
BILIRUB SERPL-MCNC: 0.2 MG/DL
BUN SERPL-MCNC: 22.4 MG/DL (ref 8–23)
CALCIUM SERPL-MCNC: 10 MG/DL (ref 8.8–10.2)
CHLORIDE SERPL-SCNC: 102 MMOL/L (ref 98–107)
CHOLEST SERPL-MCNC: 252 MG/DL
CREAT SERPL-MCNC: 0.92 MG/DL (ref 0.51–0.95)
DEPRECATED HCO3 PLAS-SCNC: 28 MMOL/L (ref 22–29)
GFR SERPL CREATININE-BSD FRML MDRD: 65 ML/MIN/1.73M2
GLUCOSE SERPL-MCNC: 85 MG/DL (ref 70–99)
HDLC SERPL-MCNC: 42 MG/DL
LDLC SERPL CALC-MCNC: ABNORMAL MG/DL
NONHDLC SERPL-MCNC: 210 MG/DL
POTASSIUM SERPL-SCNC: 3.2 MMOL/L (ref 3.4–5.3)
PROT SERPL-MCNC: 7.1 G/DL (ref 6.4–8.3)
SODIUM SERPL-SCNC: 141 MMOL/L (ref 136–145)
TRIGL SERPL-MCNC: 438 MG/DL

## 2022-08-30 PROCEDURE — 80053 COMPREHEN METABOLIC PANEL: CPT | Performed by: FAMILY MEDICINE

## 2022-08-30 PROCEDURE — 83721 ASSAY OF BLOOD LIPOPROTEIN: CPT | Mod: 59 | Performed by: FAMILY MEDICINE

## 2022-08-30 PROCEDURE — 36415 COLL VENOUS BLD VENIPUNCTURE: CPT | Performed by: FAMILY MEDICINE

## 2022-08-30 PROCEDURE — 80061 LIPID PANEL: CPT | Performed by: FAMILY MEDICINE

## 2022-08-30 PROCEDURE — G0439 PPPS, SUBSEQ VISIT: HCPCS | Performed by: FAMILY MEDICINE

## 2022-08-30 RX ORDER — ATENOLOL 50 MG/1
50 TABLET ORAL DAILY
Qty: 90 TABLET | Refills: 3 | Status: SHIPPED | OUTPATIENT
Start: 2022-08-30 | End: 2023-09-06

## 2022-08-30 RX ORDER — PRAMIPEXOLE DIHYDROCHLORIDE 0.12 MG/1
.125-.25 TABLET ORAL AT BEDTIME
Qty: 180 TABLET | Refills: 3 | Status: SHIPPED | OUTPATIENT
Start: 2022-08-30 | End: 2023-09-06

## 2022-08-30 RX ORDER — AMOXICILLIN 500 MG/1
CAPSULE ORAL
Qty: 8 CAPSULE | Refills: 0 | Status: SHIPPED | OUTPATIENT
Start: 2022-08-30

## 2022-08-30 RX ORDER — HYDROCHLOROTHIAZIDE 25 MG/1
25 TABLET ORAL DAILY
Qty: 90 TABLET | Refills: 3 | Status: SHIPPED | OUTPATIENT
Start: 2022-08-30 | End: 2023-08-09

## 2022-08-30 ASSESSMENT — ENCOUNTER SYMPTOMS
ARTHRALGIAS: 0
SORE THROAT: 0
JOINT SWELLING: 0
PARESTHESIAS: 0
CHILLS: 0
DYSURIA: 0
DIZZINESS: 0
COUGH: 1
EYE PAIN: 0
DIARRHEA: 0
HEMATURIA: 0
MYALGIAS: 1
WEAKNESS: 0
HEARTBURN: 1
NAUSEA: 0
HEMATOCHEZIA: 0
CONSTIPATION: 0
BREAST MASS: 0
ABDOMINAL PAIN: 0
FREQUENCY: 0
HEADACHES: 1
SHORTNESS OF BREATH: 0
FEVER: 0

## 2022-08-30 ASSESSMENT — ACTIVITIES OF DAILY LIVING (ADL): CURRENT_FUNCTION: NO ASSISTANCE NEEDED

## 2022-08-30 NOTE — PATIENT INSTRUCTIONS
Patient Education   Personalized Prevention Plan  You are due for the preventive services outlined below.  Your care team is available to assist you in scheduling these services.  If you have already completed any of these items, please share that information with your care team to update in your medical record.  Health Maintenance Due   Topic Date Due     ANNUAL REVIEW OF HM ORDERS  Never done     COVID-19 Vaccine (3 - Booster for Zach series) 03/17/2022     Flu Vaccine (1) 09/01/2022

## 2022-08-30 NOTE — PROGRESS NOTES
"SUBJECTIVE:   Kelly Campbell is a 71 year old female who presents for Preventive Visit.    Patient has been advised of split billing requirements and indicates understanding: Yes  Are you in the first 12 months of your Medicare coverage?  No    Healthy Habits:     In general, how would you rate your overall health?  Good    Frequency of exercise:  None    Do you usually eat at least 4 servings of fruit and vegetables a day, include whole grains    & fiber and avoid regularly eating high fat or \"junk\" foods?  No    Taking medications regularly:  Yes    Medication side effects:  Muscle aches and Lightheadedness    Ability to successfully perform activities of daily living:  No assistance needed    Home Safety:  No safety concerns identified    Hearing Impairment:  Difficulty following a conversation in a noisy restaurant or crowded room    In the past 6 months, have you been bothered by leaking of urine? Yes    In general, how would you rate your overall mental or emotional health?  Fair      PHQ-2 Total Score: 0    Additional concerns today:  Yes    Do you feel safe in your environment? Yes    Have you ever done Advance Care Planning? (For example, a Health Directive, POLST, or a discussion with a medical provider or your loved ones about your wishes): Yes, advance care planning is on file.    Fall risk  Fallen 2 or more times in the past year?: No  Any fall with injury in the past year?: No    Cognitive Screening   1) Repeat 3 items (Leader, Season, Table)    2) Clock draw: NORMAL  3) 3 item recall: Recalls 3 objects  Results: 3 items recalled: COGNITIVE IMPAIRMENT LESS LIKELY    Mini-CogTM Copyright DORON Cooper. Licensed by the author for use in [facility]; reprinted with permission ([email or web address].[email or web address]). All rights reserved.      Do you have sleep apnea, excessive snoring or daytime drowsiness?: no       Social History     Tobacco Use     Smoking status: Never Smoker     Smokeless tobacco: Never Used " "  Substance Use Topics     Alcohol use: Not on file         Alcohol Use 8/23/2022   Prescreen: >3 drinks/day or >7 drinks/week? No     Large mole that catches on things just medial to her right anterior pelvic bone    Current providers sharing in care for this patient include:  Patient Care Team:  Mimi Mcclure MD as PCP - General (Family Medicine)  Mimi Mcclure MD as Assigned PCP    The following health maintenance items are reviewed in Epic and correct as of today:  Health Maintenance Due   Topic Date Due     COVID-19 Vaccine (3 - Booster for Zach series) 03/17/2022     INFLUENZA VACCINE (1) 09/01/2022         Review of Systems   Constitutional: Negative for chills and fever.   HENT: Positive for congestion. Negative for ear pain, hearing loss and sore throat.    Eyes: Negative for pain and visual disturbance.   Respiratory: Positive for cough. Negative for shortness of breath.    Cardiovascular: Negative for chest pain and peripheral edema.   Gastrointestinal: Positive for heartburn. Negative for abdominal pain, constipation, diarrhea, hematochezia and nausea.   Breasts:  Negative for tenderness, breast mass and discharge.   Genitourinary: Positive for urgency. Negative for dysuria, frequency, genital sores, hematuria, pelvic pain, vaginal bleeding and vaginal discharge.   Musculoskeletal: Positive for myalgias. Negative for arthralgias and joint swelling.   Skin: Negative for rash.   Neurological: Positive for headaches. Negative for dizziness, weakness and paresthesias.   Psychiatric/Behavioral: Negative for mood changes.         OBJECTIVE:   /78   Pulse 68   Temp 97.2  F (36.2  C) (Tympanic)   Resp 12   Ht 1.575 m (5' 2\")   Wt 89 kg (196 lb 3 oz)   SpO2 96%   Breastfeeding No   BMI 35.88 kg/m   Estimated body mass index is 35.88 kg/m  as calculated from the following:    Height as of this encounter: 1.575 m (5' 2\").    Weight as of this encounter: 89 kg (196 lb 3 " oz).  Physical Exam  Objective:  Vital signs reviewed and stable  General: No acute distress  Psych: Appropriate affect  HEENT: moist mucous membranes, pupils equal, round, reactive to light and accomodation, tympanic membranes are pearly grey bilaterally  Lymph: no cervical or supraclavicular lymphadenopathy  Cardiovascular: regular rate and rhythm with no murmur  Pulmonary: clear to auscultation bilaterally with no wheeze  Abdomen: soft, non tender, non distended with normo-active bowel sounds  Extremities: warm and well perfused with no edema  Skin: warm and dry with no rash, 2.75X1.75 cm scaly seborrheic keratosis with a 2.5X1.5cm base      Diagnostic Test Results:  Labs reviewed in Epic    ASSESSMENT / PLAN:   1. Encounter for Medicare annual wellness exam  - REVIEW OF HEALTH MAINTENANCE PROTOCOL ORDERS    2. Seborrheic keratosis    Procedure note for more removal:    Verbal consent was obtained for removal of seborrheic keratosis.  Patient was laid in a supine position.  Area was cleansed with an alcohol swab and numbed with 1 cc of lidocaine with epinephrine.  Area was then cleansed with 3 iodine swabs.  Under sterile technique a derma blade was used to remove the seborrheic keratosis in its entirety.  Base measured approximately 2X1.5 cm.  Patient tolerated the procedure well.  Minimal blood loss.  Continued bruising necessitated cauterization to silver nitrate sticks.  Discussed aftercare's.      3. Restless legs syndrome  Refill - can increase to 2 tablets some nights if needed  - pramipexole (MIRAPEX) 0.125 MG tablet; Take 1-2 tablets (0.125-0.25 mg) by mouth At Bedtime  Dispense: 180 tablet; Refill: 3    4. Morbid obesity (H)  Discussed physical activity and diet    5. Benign essential hypertension  - atenolol (TENORMIN) 50 MG tablet; Take 1 tablet (50 mg) by mouth daily  Dispense: 90 tablet; Refill: 3  - COMPREHENSIVE METABOLIC PANEL; Future  - COMPREHENSIVE METABOLIC PANEL    6. Essential (primary)  "hypertension  - hydrochlorothiazide (HYDRODIURIL) 25 MG tablet; Take 1 tablet (25 mg) by mouth daily  Dispense: 90 tablet; Refill: 3    7. Status post total left knee replacement  refill  - amoxicillin (AMOXIL) 500 MG capsule; TAKE 4 CAPSULES BY MOUTH ONE HOUR BEFORE APPOINTMENT  Dispense: 8 capsule; Refill: 0    8. Lipid screening  - Lipid panel reflex to direct LDL Non-fasting; Future  - Lipid panel reflex to direct LDL Non-fasting  - LDL cholesterol direct      Patient has been advised of split billing requirements and indicates understanding: Yes    COUNSELING:  Reviewed preventive health counseling, as reflected in patient instructions    Estimated body mass index is 35.88 kg/m  as calculated from the following:    Height as of this encounter: 1.575 m (5' 2\").    Weight as of this encounter: 89 kg (196 lb 3 oz).    Weight management plan: Discussed healthy diet and exercise guidelines    She reports that she has never smoked. She has never used smokeless tobacco.      Appropriate preventive services were discussed with this patient, including applicable screening as appropriate for cardiovascular disease, diabetes, osteopenia/osteoporosis, and glaucoma.  As appropriate for age/gender, discussed screening for colorectal cancer, prostate cancer, breast cancer, and cervical cancer. Checklist reviewing preventive services available has been given to the patient.    Reviewed patients plan of care and provided an AVS. The Basic Care Plan (routine screening as documented in Health Maintenance) for Kelly meets the Care Plan requirement. This Care Plan has been established and reviewed with the Patient.    Counseling Resources:  ATP IV Guidelines  Pooled Cohorts Equation Calculator  Breast Cancer Risk Calculator  Breast Cancer: Medication to Reduce Risk  FRAX Risk Assessment  ICSI Preventive Guidelines  Dietary Guidelines for Americans, 2010  USDA's MyPlate  ASA Prophylaxis  Lung CA Screening    Mimi Mcclure, " MD [facility]      Procedure note for more removal:    Verbal consent was obtained for removal of seborrheic keratosis.  Patient was laid in a supine position.  Area was cleansed with an alcohol swab and numbed with 1 cc of lidocaine with epinephrine.  Area was then cleansed with 3 iodine swabs.  Under sterile technique a derma blade was used to remove the seborrheic keratosis in its entirety.  Base measured approximately 2X1.5 cm.  Patient tolerated the procedure well.  Minimal blood loss.  Continued bruising necessitated cauterization to silver nitrate sticks.  Discussed aftercare's.

## 2022-08-31 LAB — LDLC SERPL DIRECT ASSAY-MCNC: 139 MG/DL

## 2022-09-14 ENCOUNTER — LAB (OUTPATIENT)
Dept: LAB | Facility: CLINIC | Age: 72
End: 2022-09-14
Payer: COMMERCIAL

## 2022-09-14 DIAGNOSIS — E87.6 HYPOKALEMIA: ICD-10-CM

## 2022-09-14 LAB
ANION GAP SERPL CALCULATED.3IONS-SCNC: 13 MMOL/L (ref 7–15)
BUN SERPL-MCNC: 22.9 MG/DL (ref 8–23)
CALCIUM SERPL-MCNC: 10.5 MG/DL (ref 8.8–10.2)
CHLORIDE SERPL-SCNC: 103 MMOL/L (ref 98–107)
CREAT SERPL-MCNC: 0.93 MG/DL (ref 0.51–0.95)
DEPRECATED HCO3 PLAS-SCNC: 28 MMOL/L (ref 22–29)
GFR SERPL CREATININE-BSD FRML MDRD: 65 ML/MIN/1.73M2
GLUCOSE SERPL-MCNC: 98 MG/DL (ref 70–99)
POTASSIUM SERPL-SCNC: 3.8 MMOL/L (ref 3.4–5.3)
SODIUM SERPL-SCNC: 144 MMOL/L (ref 136–145)

## 2022-09-14 PROCEDURE — 80048 BASIC METABOLIC PNL TOTAL CA: CPT

## 2022-09-14 PROCEDURE — 36415 COLL VENOUS BLD VENIPUNCTURE: CPT

## 2023-04-23 ENCOUNTER — HEALTH MAINTENANCE LETTER (OUTPATIENT)
Age: 73
End: 2023-04-23

## 2023-07-08 DIAGNOSIS — I10 ESSENTIAL (PRIMARY) HYPERTENSION: ICD-10-CM

## 2023-07-10 RX ORDER — HYDROCHLOROTHIAZIDE 25 MG/1
TABLET ORAL
Qty: 90 TABLET | Refills: 3 | OUTPATIENT
Start: 2023-07-10

## 2023-07-10 NOTE — TELEPHONE ENCOUNTER
Not refilled   Medication filled on 8/30/22 for 90 days with 3 refills.  To early to refill.  Akin Mac RN

## 2023-07-13 DIAGNOSIS — I10 ESSENTIAL (PRIMARY) HYPERTENSION: ICD-10-CM

## 2023-07-13 RX ORDER — HYDROCHLOROTHIAZIDE 25 MG/1
TABLET ORAL
Qty: 90 TABLET | Refills: 3 | OUTPATIENT
Start: 2023-07-13

## 2023-08-01 ENCOUNTER — PATIENT OUTREACH (OUTPATIENT)
Dept: CARE COORDINATION | Facility: CLINIC | Age: 73
End: 2023-08-01
Payer: COMMERCIAL

## 2023-08-09 ENCOUNTER — TELEPHONE (OUTPATIENT)
Dept: FAMILY MEDICINE | Facility: CLINIC | Age: 73
End: 2023-08-09
Payer: COMMERCIAL

## 2023-08-09 DIAGNOSIS — I10 ESSENTIAL (PRIMARY) HYPERTENSION: ICD-10-CM

## 2023-08-09 RX ORDER — HYDROCHLOROTHIAZIDE 25 MG/1
25 TABLET ORAL DAILY
Qty: 90 TABLET | Refills: 0 | Status: SHIPPED | OUTPATIENT
Start: 2023-08-09 | End: 2023-09-06

## 2023-08-09 NOTE — TELEPHONE ENCOUNTER
Patient needs refill of hydrochlorothiazide to get to appt please.    Hattie Orellana, PSC [place]

## 2023-08-29 ENCOUNTER — PATIENT OUTREACH (OUTPATIENT)
Dept: CARE COORDINATION | Facility: CLINIC | Age: 73
End: 2023-08-29
Payer: COMMERCIAL

## 2023-09-06 ENCOUNTER — OFFICE VISIT (OUTPATIENT)
Dept: FAMILY MEDICINE | Facility: CLINIC | Age: 73
End: 2023-09-06
Payer: COMMERCIAL

## 2023-09-06 ENCOUNTER — LAB (OUTPATIENT)
Dept: FAMILY MEDICINE | Facility: CLINIC | Age: 73
End: 2023-09-06

## 2023-09-06 VITALS
TEMPERATURE: 97.9 F | BODY MASS INDEX: 35.7 KG/M2 | RESPIRATION RATE: 12 BRPM | OXYGEN SATURATION: 96 % | HEART RATE: 58 BPM | DIASTOLIC BLOOD PRESSURE: 72 MMHG | SYSTOLIC BLOOD PRESSURE: 110 MMHG | HEIGHT: 62 IN | WEIGHT: 194 LBS

## 2023-09-06 DIAGNOSIS — E66.01 MORBID OBESITY (H): ICD-10-CM

## 2023-09-06 DIAGNOSIS — I10 BENIGN ESSENTIAL HYPERTENSION: ICD-10-CM

## 2023-09-06 DIAGNOSIS — G25.81 RESTLESS LEGS SYNDROME: ICD-10-CM

## 2023-09-06 DIAGNOSIS — Z12.31 VISIT FOR SCREENING MAMMOGRAM: ICD-10-CM

## 2023-09-06 DIAGNOSIS — Z00.00 ENCOUNTER FOR MEDICARE ANNUAL WELLNESS EXAM: Primary | ICD-10-CM

## 2023-09-06 DIAGNOSIS — K21.00 GASTROESOPHAGEAL REFLUX DISEASE WITH ESOPHAGITIS WITHOUT HEMORRHAGE: ICD-10-CM

## 2023-09-06 DIAGNOSIS — Z12.11 SCREEN FOR COLON CANCER: ICD-10-CM

## 2023-09-06 DIAGNOSIS — Z96.653 STATUS POST TOTAL BILATERAL KNEE REPLACEMENT: ICD-10-CM

## 2023-09-06 DIAGNOSIS — Z13.220 LIPID SCREENING: ICD-10-CM

## 2023-09-06 DIAGNOSIS — G47.00 INSOMNIA, UNSPECIFIED TYPE: ICD-10-CM

## 2023-09-06 DIAGNOSIS — R25.2 LEG CRAMPING: ICD-10-CM

## 2023-09-06 LAB
ANION GAP SERPL CALCULATED.3IONS-SCNC: 10 MMOL/L (ref 7–15)
BUN SERPL-MCNC: 23.7 MG/DL (ref 8–23)
CALCIUM SERPL-MCNC: 10.9 MG/DL (ref 8.8–10.2)
CHLORIDE SERPL-SCNC: 102 MMOL/L (ref 98–107)
CHOLEST SERPL-MCNC: 271 MG/DL
CREAT SERPL-MCNC: 0.93 MG/DL (ref 0.51–0.95)
DEPRECATED HCO3 PLAS-SCNC: 29 MMOL/L (ref 22–29)
EGFRCR SERPLBLD CKD-EPI 2021: 64 ML/MIN/1.73M2
ERYTHROCYTE [DISTWIDTH] IN BLOOD BY AUTOMATED COUNT: 12.9 % (ref 10–15)
GLUCOSE SERPL-MCNC: 95 MG/DL (ref 70–99)
HCT VFR BLD AUTO: 38.7 % (ref 35–47)
HDLC SERPL-MCNC: 41 MG/DL
HGB BLD-MCNC: 13 G/DL (ref 11.7–15.7)
IRON BINDING CAPACITY (ROCHE): 359 UG/DL (ref 240–430)
IRON SATN MFR SERPL: 26 % (ref 15–46)
IRON SERPL-MCNC: 94 UG/DL (ref 37–145)
LDLC SERPL CALC-MCNC: 157 MG/DL
MAGNESIUM SERPL-MCNC: 1.9 MG/DL (ref 1.7–2.3)
MCH RBC QN AUTO: 30.1 PG (ref 26.5–33)
MCHC RBC AUTO-ENTMCNC: 33.6 G/DL (ref 31.5–36.5)
MCV RBC AUTO: 90 FL (ref 78–100)
NONHDLC SERPL-MCNC: 230 MG/DL
PLATELET # BLD AUTO: 309 10E3/UL (ref 150–450)
POTASSIUM SERPL-SCNC: 3.9 MMOL/L (ref 3.4–5.3)
RBC # BLD AUTO: 4.32 10E6/UL (ref 3.8–5.2)
SODIUM SERPL-SCNC: 141 MMOL/L (ref 136–145)
TRIGL SERPL-MCNC: 365 MG/DL
WBC # BLD AUTO: 8.3 10E3/UL (ref 4–11)

## 2023-09-06 PROCEDURE — 83550 IRON BINDING TEST: CPT | Performed by: FAMILY MEDICINE

## 2023-09-06 PROCEDURE — 83540 ASSAY OF IRON: CPT | Performed by: FAMILY MEDICINE

## 2023-09-06 PROCEDURE — 80048 BASIC METABOLIC PNL TOTAL CA: CPT | Performed by: FAMILY MEDICINE

## 2023-09-06 PROCEDURE — G0439 PPPS, SUBSEQ VISIT: HCPCS | Performed by: FAMILY MEDICINE

## 2023-09-06 PROCEDURE — 36415 COLL VENOUS BLD VENIPUNCTURE: CPT | Performed by: FAMILY MEDICINE

## 2023-09-06 PROCEDURE — 83735 ASSAY OF MAGNESIUM: CPT | Performed by: FAMILY MEDICINE

## 2023-09-06 PROCEDURE — 85027 COMPLETE CBC AUTOMATED: CPT | Performed by: FAMILY MEDICINE

## 2023-09-06 PROCEDURE — 99214 OFFICE O/P EST MOD 30 MIN: CPT | Mod: 25 | Performed by: FAMILY MEDICINE

## 2023-09-06 PROCEDURE — 80061 LIPID PANEL: CPT | Performed by: FAMILY MEDICINE

## 2023-09-06 RX ORDER — PRAMIPEXOLE DIHYDROCHLORIDE 0.12 MG/1
.125-.25 TABLET ORAL AT BEDTIME
Qty: 180 TABLET | Refills: 3 | Status: SHIPPED | OUTPATIENT
Start: 2023-09-06

## 2023-09-06 RX ORDER — ATENOLOL 50 MG/1
50 TABLET ORAL DAILY
Qty: 90 TABLET | Refills: 3 | Status: SHIPPED | OUTPATIENT
Start: 2023-09-06 | End: 2024-09-05

## 2023-09-06 ASSESSMENT — ENCOUNTER SYMPTOMS
DYSURIA: 0
NAUSEA: 0
FREQUENCY: 1
COUGH: 1
BREAST MASS: 0
MYALGIAS: 0
CHILLS: 0
PALPITATIONS: 0
HEARTBURN: 1
NERVOUS/ANXIOUS: 0
PARESTHESIAS: 0
SHORTNESS OF BREATH: 0
FEVER: 0
ARTHRALGIAS: 1
SORE THROAT: 0
CONSTIPATION: 1
DIZZINESS: 0
WEAKNESS: 0
DIARRHEA: 0
ABDOMINAL PAIN: 0
HEMATOCHEZIA: 0
JOINT SWELLING: 0
EYE PAIN: 0
HEADACHES: 1
HEMATURIA: 0

## 2023-09-06 ASSESSMENT — ACTIVITIES OF DAILY LIVING (ADL): CURRENT_FUNCTION: NO ASSISTANCE NEEDED

## 2023-09-06 NOTE — PROGRESS NOTES
"Answers submitted by the patient for this visit:  Annual Preventive Visit (Submitted on 9/6/2023)  Chief Complaint: Annual Exam:  In general, how would you rate your overall physical health?: good  Frequency of exercise:: 2-3 days/week  Do you usually eat at least 4 servings of fruit and vegetables a day, include whole grains & fiber, and avoid regularly eating high fat or \"junk\" foods? : No  Taking medications regularly:: Yes  Activities of Daily Living: no assistance needed  Home safety: lack of grab bars in the bathroom  Hearing Impairment:: difficulty understanding soft or whispered speech  In the past 6 months, have you been bothered by leaking of urine?: No  abdominal pain: No  Blood in stool: No  Blood in urine: No  chest pain: No  chills: No  congestion: Yes  constipation: Yes  cough: Yes  diarrhea: No  dizziness: No  ear pain: No  eye pain: No  nervous/anxious: No  fever: No  frequency: Yes  genital sores: No  headaches: Yes  hearing loss: No  heartburn: Yes  arthralgias: Yes  joint swelling: No  peripheral edema: Yes  mood changes: No  myalgias: No  nausea: No  dysuria: No  palpitations: No  Skin sensation changes: No  sore throat: No  urgency: No  rash: No  shortness of breath: No  visual disturbance: No  weakness: No  pelvic pain: No  vaginal bleeding: No  vaginal discharge: No  tenderness: No  breast mass: No  breast discharge: No  In general, how would you rate your overall mental or emotional health?: fair  Additional concerns today:: No  Exercise outside of work (Submitted on 9/6/2023)  Chief Complaint: Annual Exam:  Duration of exercise:: 15-30 minutes    SUBJECTIVE:   Kelly is a 72 year old who presents for Preventive Visit.  {(!) Visit Details have not yet been documented.  Please enter Visit Details and then use this list to pull in documentation. (Optional):425332}    Are you in the first 12 months of your Medicare coverage?  { :987644}    HPI      Have you ever done Advance Care Planning? (For " example, a Health Directive, POLST, or a discussion with a medical provider or your loved ones about your wishes): { :707502}    {Hearing Test Done (Optional):067898}   Fall risk  { :277052}  {If any of the above assessments are answered yes, consider ordering appropriate referrals (Optional):028778}  Cognitive Screening { :155085}    {Do you have sleep apnea, excessive snoring or daytime drowsiness? (Optional):367758}    Reviewed and updated as needed this visit by clinical staff    Allergies  Meds              Reviewed and updated as needed this visit by Provider                 Social History     Tobacco Use    Smoking status: Never    Smokeless tobacco: Never   Substance Use Topics    Alcohol use: Not on file     {Rooming staff  Click this link to complete the Prescreen if response below is not for today's visit  Alcohol Use Prescreen >3 drinks/day or > 7 drinks/week.  If the prescreen question answer is YES, complete the full AUDIT  :247411}        9/6/2023     1:23 PM   Alcohol Use   Prescreen: >3 drinks/day or >7 drinks/week? No   {add AUDIT responses (Optional) (A score of 7 for adult men is an indication of hazardous drinking; a score of 8 or more is an indication of an alcohol use disorder.  A score of 7 or more for adult women is an indication of hazardous drinking or an alchohol use disorder):847301}  Do you have a current opioid prescription? { :040386}  Do you use any other controlled substances or medications that are not prescribed by a provider? {Substance Use :189176}  {Provider  If there are gaps in the social history shown above, please follow the link and refresh the note Link to Social and Substance History :492690}    {Outside tests to abstract? :657700}    {additional problems to add (Optional):732983}    Current providers sharing in care for this patient include: {Rooming staff:  Please update Care Team from storyboard, refresh this note and then delete this statement}  Patient Care  "Team:  Mimi Mcclure MD as PCP - General (Family Medicine)  Mimi Mcclure MD as Assigned PCP    The following health maintenance items are reviewed in Epic and correct as of today:  Health Maintenance   Topic Date Due    COLORECTAL CANCER SCREENING  2023    COVID-19 Vaccine (4 - Additional dose for Zach series) 2023    INFLUENZA VACCINE (1) 2023    MAMMO SCREENING  2023    MEDICARE ANNUAL WELLNESS VISIT  2024    FALL RISK ASSESSMENT  2024    LIPID  2027    ADVANCE CARE PLANNING  2027    DTAP/TDAP/TD IMMUNIZATION (3 - Td or Tdap) 2031    DEXA  2034    HEPATITIS C SCREENING  Completed    PHQ-2 (once per calendar year)  Completed    Pneumococcal Vaccine: 65+ Years  Completed    ZOSTER IMMUNIZATION  Completed    IPV IMMUNIZATION  Aged Out    HPV IMMUNIZATION  Aged Out    MENINGITIS IMMUNIZATION  Aged Out     {Chronicprobdata (optional):753974}  {Decision Support (Optional):000230}        Review of Systems  {ROS COMP (Optional):653093}    OBJECTIVE:   /72   Pulse 58   Temp 97.9  F (36.6  C) (Tympanic)   Resp 12   Ht 1.58 m (5' 2.21\")   Wt 88 kg (194 lb)   SpO2 96%   BMI 35.25 kg/m   Estimated body mass index is 35.25 kg/m  as calculated from the following:    Height as of this encounter: 1.58 m (5' 2.21\").    Weight as of this encounter: 88 kg (194 lb).  Physical Exam  {Exam (Optional) :985397}    {Diagnostic Test Results (Optional):544650}    ASSESSMENT / PLAN:   {Diag Picklist:372282}    {Patient advised of split billing (Optional):673336}      COUNSELING:  {Medicare Counselin}      BMI:   Estimated body mass index is 35.25 kg/m  as calculated from the following:    Height as of this encounter: 1.58 m (5' 2.21\").    Weight as of this encounter: 88 kg (194 lb).   {Weight Management Plan needed for ACO:587632}      She reports that she has never smoked. She has never used smokeless tobacco.      Appropriate preventive " services were discussed with this patient, including applicable screening as appropriate for cardiovascular disease, diabetes, osteopenia/osteoporosis, and glaucoma.  As appropriate for age/gender, discussed screening for colorectal cancer, prostate cancer, breast cancer, and cervical cancer. Checklist reviewing preventive services available has been given to the patient.    Reviewed patients plan of care and provided an AVS. The {CarePlan:679286} for Kelly meets the Care Plan requirement. This Care Plan has been established and reviewed with the {PATIENT, FAMILY MEMBER, CAREGIVER:834491}.    {Counseling Resources  US Preventive Services Task Force  Cholesterol Screening  Health diet/nutrition  Pooled Cohorts Equation Calculator  [employer] MyPlate  ASA Prophylaxis  Lung CA Screening  Osteoporosis prevention/bone health :015671}  {Breast Cancer Risk Calculator  BRCA-Related Cancer Risk Assessment FHS-7 Tool :494174}    Mimi Mcclure MD  [facility]    Identified Health Risks:  {Medicare required documentation of substance and opioid use disorders screening :710762}

## 2023-09-06 NOTE — PATIENT INSTRUCTIONS
Referral to [facility]    Will check magnesium and iron for leg cramping - please work on drinking at least 1-2 8oz glasses of water/day and stretching    STOP hydrochlorothiazide and check BP a few times a week and let me know the numbers - we would like it to stay under 140/90          Patient Education   Personalized Prevention Plan  You are due for the preventive services outlined below.  Your care team is available to assist you in scheduling these services.  If you have already completed any of these items, please share that information with your care team to update in your medical record.  Health Maintenance Due   Topic Date Due    PHQ-2 (once per calendar year)  01/01/2023    Colorectal Cancer Screening  02/12/2023    COVID-19 Vaccine (4 - Additional dose for Zach series) 05/19/2023    ANNUAL REVIEW OF HM ORDERS  08/30/2023    FALL RISK ASSESSMENT  08/30/2023    Flu Vaccine (1) 09/01/2023    Mammogram  08/31/2023

## 2023-09-06 NOTE — PROGRESS NOTES
"SUBJECTIVE:   Kelly is a 72 year old who presents for Preventive Visit.    Are you in the first 12 months of your Medicare coverage?  No    Musculoskeletal Problem  Associated symptoms include arthralgias, congestion, coughing and headaches. Pertinent negatives include no abdominal pain, chest pain, chills, fever, joint swelling, myalgias, nausea, rash, sore throat or weakness.   Healthy Habits:     In general, how would you rate your overall health?  Good    Frequency of exercise:  2-3 days/week    Duration of exercise:  15-30 minutes    Do you usually eat at least 4 servings of fruit and vegetables a day, include whole grains    & fiber and avoid regularly eating high fat or \"junk\" foods?  No    Taking medications regularly:  Yes    Ability to successfully perform activities of daily living:  No assistance needed    Home Safety:  Lack of grab bars in the bathroom    Hearing Impairment:  Difficulty understanding soft or whispered speech    In the past 6 months, have you been bothered by leaking of urine?  No    In general, how would you rate your overall mental or emotional health?  Fair    Additional concerns today:  No        Have you ever done Advance Care Planning? (For example, a Health Directive, POLST, or a discussion with a medical provider or your loved ones about your wishes): Yes, patient states has an Advance Care Planning document and will bring a copy to the clinic.    Fall risk  Fallen 2 or more times in the past year?: No  Any fall with injury in the past year?: Yes  Fall was not do to balance but tripped outside  Cognitive Screening   1) Repeat 3 items (Leader, Season, Table)    2) Clock draw: NORMAL  3) 3 item recall: Recalls 3 objects  Results: 3 items recalled: COGNITIVE IMPAIRMENT LESS LIKELY    Mini-CogTM Copyright DORON Cooper. Licensed by the author for use in [facility]; reprinted with permission (luba@.[email or web address]). All rights reserved.      Do you have sleep apnea, excessive snoring " or daytime drowsiness? : no    Reviewed and updated as needed this visit by clinical staff    Allergies  Meds              Reviewed and updated as needed this visit by Provider                 Social History     Tobacco Use    Smoking status: Never    Smokeless tobacco: Never   Substance Use Topics    Alcohol use: Not on file             9/6/2023     1:23 PM   Alcohol Use   Prescreen: >3 drinks/day or >7 drinks/week? No     Do you have a current opioid prescription? No  Do you use any other controlled substances or medications that are not prescribed by a provider? None    Restless leg syndrome: Takes Mirapex occasionally  Hypertension: Currently on atenolol and hydrochlorothiazide.  Blood pressure today is 110/72.  She denies any lightheadedness.  Leg cramping: Patient notes that she will often have cramping in her legs bilaterally.  These are in the posterior upper thighs.  She states that she does not drink much water.  She will drink lemonade and juice throughout the day.  She does not do much for physical activity but is joining Silver sneakers.  Knee pain: Patient has bilateral knee pain worse on the right.  She states that she twisted her right knee earlier this summer and it swelled up although is feeling a bit better now.  She states that 1 time it seemed like it locked.  She does have bilateral knee replacements.  She this was done through [facility].  Insomnia: Patient notes that she will often fall asleep fairly well but wake up a few hours later.  Sometimes she then has a hard time falling back asleep.  She is often thinking about different things and has a hard time going back to sleep until those things are resolved she gives an example of putting some paperwork by her purse to bring to clinic today that she needed to get out of bed to do at 2 AM).    Current providers sharing in care for this patient include:   Patient Care Team:  Mimi Mcclure MD as PCP - General (Family  Medicine)  Mimi Mcclure MD as Assigned PCP    The following health maintenance items are reviewed in Epic and correct as of today:  Health Maintenance   Topic Date Due    COLORECTAL CANCER SCREENING  02/12/2023    COVID-19 Vaccine (4 - Additional dose for Zach series) 05/19/2023    INFLUENZA VACCINE (1) 09/01/2023    MAMMO SCREENING  08/31/2023    MEDICARE ANNUAL WELLNESS VISIT  09/06/2024    ANNUAL REVIEW OF HM ORDERS  09/06/2024    FALL RISK ASSESSMENT  09/06/2024    LIPID  08/30/2027    ADVANCE CARE PLANNING  09/05/2027    DTAP/TDAP/TD IMMUNIZATION (3 - Td or Tdap) 07/12/2031    DEXA  12/19/2034    HEPATITIS C SCREENING  Completed    PHQ-2 (once per calendar year)  Completed    Pneumococcal Vaccine: 65+ Years  Completed    ZOSTER IMMUNIZATION  Completed    IPV IMMUNIZATION  Aged Out    HPV IMMUNIZATION  Aged Out    MENINGITIS IMMUNIZATION  Aged Out               Review of Systems   Constitutional:  Negative for chills and fever.   HENT:  Positive for congestion. Negative for ear pain, hearing loss and sore throat.    Eyes:  Negative for pain and visual disturbance.   Respiratory:  Positive for cough. Negative for shortness of breath.    Cardiovascular:  Positive for peripheral edema. Negative for chest pain and palpitations.   Gastrointestinal:  Positive for constipation and heartburn. Negative for abdominal pain, diarrhea, hematochezia and nausea.   Breasts:  Negative for tenderness, breast mass and discharge.   Genitourinary:  Positive for frequency. Negative for dysuria, genital sores, hematuria, pelvic pain, urgency, vaginal bleeding and vaginal discharge.   Musculoskeletal:  Positive for arthralgias. Negative for joint swelling and myalgias.   Skin:  Negative for rash.   Neurological:  Positive for headaches. Negative for dizziness, weakness and paresthesias.   Psychiatric/Behavioral:  Negative for mood changes. The patient is not nervous/anxious.          OBJECTIVE:   /72   Pulse 58    "Temp 97.9  F (36.6  C) (Tympanic)   Resp 12   Ht 1.58 m (5' 2.21\")   Wt 88 kg (194 lb)   SpO2 96%   BMI 35.25 kg/m   Estimated body mass index is 35.25 kg/m  as calculated from the following:    Height as of this encounter: 1.58 m (5' 2.21\").    Weight as of this encounter: 88 kg (194 lb).  Physical Exam  Objective:  Vital signs reviewed and stable  General: No acute distress  Psych: Appropriate affect  HEENT: moist mucous membranes, pupils equal, round, reactive to light and accomodation, tympanic membranes are pearly grey bilaterally  Lymph: no cervical or supraclavicular lymphadenopathy  Cardiovascular: regular rate and rhythm with no murmur  Pulmonary: clear to auscultation bilaterally with no wheeze  Abdomen: soft, non tender, non distended with normo-active bowel sounds  Extremities: warm and well perfused with no edema  Skin: warm and dry with no rash          ASSESSMENT / PLAN:   1. Encounter for Medicare annual wellness exam  Doing well overall.  Starting Silver sneakers  - PRIMARY CARE FOLLOW-UP SCHEDULING; Future    2. Screen for colon cancer  - COLOGUARD(EXACT SCIENCES); Future    3. Visit for screening mammogram  - MA SCREENING DIGITAL BILAT - Future  (s+30); Future    4. Restless legs syndrome  Refill Mirapex  - pramipexole (MIRAPEX) 0.125 MG tablet; Take 1-2 tablets (0.125-0.25 mg) by mouth At Bedtime  Dispense: 180 tablet; Refill: 3  - Iron and iron binding capacity; Future  - Iron and iron binding capacity    5. Benign essential hypertension  Patient will stop her hydrochlorothiazide.  This could be causing her leg cramping.  Also, it does not seem as though she needs it given her blood pressure readings.  She will continue to monitor her blood pressure and let me know if anything changes.  Discussed that I would like her numbers to stay less than 140/90  - atenolol (TENORMIN) 50 MG tablet; Take 1 tablet (50 mg) by mouth daily  Dispense: 90 tablet; Refill: 3  - BASIC METABOLIC PANEL; Future  - " "CBC with Platelets; Future  - Magnesium; Future  - BASIC METABOLIC PANEL  - CBC with Platelets  - Magnesium    6. Lipid screening  - CBC with Platelets; Future  - Lipid panel reflex to direct LDL Fasting; Future  - CBC with Platelets  - Lipid panel reflex to direct LDL Fasting    7. Leg cramping  Discussed good fluid hydration.  Discussed stretching.  Potentially stopping hydrochlorothiazide will help as well.  - Iron and iron binding capacity; Future  - Iron and iron binding capacity    8. Morbid obesity (H)  Patient is starting Silver sneakers.  Encouragement given    9. Gastroesophageal reflux disease with esophagitis without hemorrhage  Refill  - omeprazole (PRILOSEC) 20 MG DR capsule; Take 1 capsule (20 mg) by mouth daily  Dispense: 90 capsule; Refill: 3    10. Status post total bilateral knee replacement  Discussed that we could do x-rays today or have her see the physical therapist.  Patient prefers to go back to [facility] which I think is reasonable as well.  Referral placed.  - Orthopedic  Referral; Future    11.  Insomnia  Discussed patient's insomnia with her.  Encouraged her to get a notebook by her bed so she can write down things that she is thinking of at night which may help.  Discussed medication but she declines at this time.  Discussed meditation as well    Patient has been advised of split billing requirements and indicates understanding: Yes      COUNSELING:  Reviewed preventive health counseling, as reflected in patient instructions      BMI:   Estimated body mass index is 35.25 kg/m  as calculated from the following:    Height as of this encounter: 1.58 m (5' 2.21\").    Weight as of this encounter: 88 kg (194 lb).   Weight management plan: Discussed healthy diet and exercise guidelines      She reports that she has never smoked. She has never used smokeless tobacco.      Appropriate preventive services were discussed with this patient, including applicable screening as " appropriate for cardiovascular disease, diabetes, osteopenia/osteoporosis, and glaucoma.  As appropriate for age/gender, discussed screening for colorectal cancer, prostate cancer, breast cancer, and cervical cancer. Checklist reviewing preventive services available has been given to the patient.    Reviewed patients plan of care and provided an AVS. The Basic Care Plan (routine screening as documented in Health Maintenance) for Kelly meets the Care Plan requirement. This Care Plan has been established and reviewed with the Patient.          Mimi Mcclure MD  [facility]    Identified Health Risks:  I have reviewed Opioid Use Disorder and Substance Use Disorder risk factors and made any needed referrals.

## 2023-09-19 ENCOUNTER — NURSE TRIAGE (OUTPATIENT)
Dept: FAMILY MEDICINE | Facility: CLINIC | Age: 73
End: 2023-09-19
Payer: COMMERCIAL

## 2023-09-19 ENCOUNTER — ANCILLARY PROCEDURE (OUTPATIENT)
Dept: MAMMOGRAPHY | Facility: CLINIC | Age: 73
End: 2023-09-19
Attending: FAMILY MEDICINE
Payer: COMMERCIAL

## 2023-09-19 ENCOUNTER — ANCILLARY ORDERS (OUTPATIENT)
Dept: FAMILY MEDICINE | Facility: CLINIC | Age: 73
End: 2023-09-19

## 2023-09-19 DIAGNOSIS — Z96.653 STATUS POST TOTAL BILATERAL KNEE REPLACEMENT: ICD-10-CM

## 2023-09-19 DIAGNOSIS — Z00.00 ENCOUNTER FOR MEDICARE ANNUAL WELLNESS EXAM: Primary | ICD-10-CM

## 2023-09-19 DIAGNOSIS — R25.2 LEG CRAMPING: ICD-10-CM

## 2023-09-19 DIAGNOSIS — Z12.31 VISIT FOR SCREENING MAMMOGRAM: ICD-10-CM

## 2023-09-19 DIAGNOSIS — I10 BENIGN ESSENTIAL HYPERTENSION: ICD-10-CM

## 2023-09-19 DIAGNOSIS — G25.81 RESTLESS LEGS SYNDROME: ICD-10-CM

## 2023-09-19 DIAGNOSIS — Z13.220 LIPID SCREENING: ICD-10-CM

## 2023-09-19 DIAGNOSIS — Z12.11 SCREEN FOR COLON CANCER: ICD-10-CM

## 2023-09-19 DIAGNOSIS — E66.01 MORBID OBESITY (H): ICD-10-CM

## 2023-09-19 DIAGNOSIS — G47.00 INSOMNIA, UNSPECIFIED TYPE: ICD-10-CM

## 2023-09-19 DIAGNOSIS — K21.00 GASTROESOPHAGEAL REFLUX DISEASE WITH ESOPHAGITIS WITHOUT HEMORRHAGE: ICD-10-CM

## 2023-09-19 PROCEDURE — 77067 SCR MAMMO BI INCL CAD: CPT

## 2023-09-19 NOTE — TELEPHONE ENCOUNTER
See mychart message from today.  RN tried to reach patient.   No answer.   LVM to call back to [phone].   Kellie Enrique RN on 9/19/2023 at 1:15 PM

## 2023-09-20 ENCOUNTER — DOCUMENTATION ONLY (OUTPATIENT)
Dept: OTHER | Facility: CLINIC | Age: 73
End: 2023-09-20
Payer: COMMERCIAL

## 2023-09-20 LAB — NONINV COLON CA DNA+OCC BLD SCRN STL QL: POSITIVE

## 2023-09-24 ENCOUNTER — MYC MEDICAL ADVICE (OUTPATIENT)
Dept: FAMILY MEDICINE | Facility: CLINIC | Age: 73
End: 2023-09-24
Payer: COMMERCIAL

## 2023-09-24 DIAGNOSIS — R19.5 POSITIVE COLORECTAL CANCER SCREENING USING COLOGUARD TEST: ICD-10-CM

## 2023-09-24 DIAGNOSIS — E78.5 HYPERLIPIDEMIA LDL GOAL <100: Primary | ICD-10-CM

## 2023-09-25 RX ORDER — ATORVASTATIN CALCIUM 10 MG/1
10 TABLET, FILM COATED ORAL DAILY
Qty: 90 TABLET | Refills: 3 | Status: SHIPPED | OUTPATIENT
Start: 2023-09-25 | End: 2024-01-19

## 2023-10-05 ENCOUNTER — HOSPITAL ENCOUNTER (OUTPATIENT)
Facility: AMBULATORY SURGERY CENTER | Age: 73
End: 2023-10-05
Attending: SURGERY
Payer: COMMERCIAL

## 2023-10-05 ENCOUNTER — TELEPHONE (OUTPATIENT)
Dept: GASTROENTEROLOGY | Facility: CLINIC | Age: 73
End: 2023-10-05
Payer: COMMERCIAL

## 2023-10-05 NOTE — TELEPHONE ENCOUNTER
"Endoscopy Scheduling Screen    Have you had a positive Covid test in the last 14 days?  No    Are you active on MyChart?   Yes    What insurance is in the chart?  Other:  BCBS MEDICARE    Ordering/Referring Provider: HERBERTH FERNANDEZ   (If ordering provider performs procedure, schedule with ordering provider unless otherwise instructed. )    BMI: Estimated body mass index is 35.25 kg/m  as calculated from the following:    Height as of 9/6/23: 1.58 m (5' 2.21\").    Weight as of 9/6/23: 88 kg (194 lb).     Sedation Ordered  moderate sedation.   If patient BMI > 50 do not schedule in [facility].    If patient BMI > 45 do not schedule at [facility].    Are you taking methadone or Suboxone?  No    Are you taking any prescription medications for pain 3 or more times per week?   No    Do you have a history of malignant hyperthermia or adverse reaction to anesthesia?  No    (Females) Are you currently pregnant?   No     Have you been diagnosed or told you have pulmonary hypertension?   No    Do you have an LVAD?  No    Have you been told you have moderate to severe sleep apnea?  No    Have you been told you have COPD, asthma, or any other lung disease?  No    Do you have any heart conditions?  No     Have you ever had an organ transplant?   No    Have you ever had or are you awaiting a heart or lung transplant?   No    Have you had a stroke or transient ischemic attack (TIA aka \"mini stroke\" in the last 6 months?   No    Have you been diagnosed with or been told you have cirrhosis of the liver?   No    Are you currently on dialysis?   No    Do you need assistance transferring?   No    BMI: Estimated body mass index is 35.25 kg/m  as calculated from the following:    Height as of 9/6/23: 1.58 m (5' 2.21\").    Weight as of 9/6/23: 88 kg (194 lb).     Is patients BMI > 40 and scheduling location [facility]?  No    Do you take an injectable medication for weight loss or diabetes (excluding insulin)?  No    Do you take the medication " Naltrexone?  No    Do you take blood thinners?  No       Prep   Are you currently on dialysis or do you have chronic kidney disease?  No    Do you have a diagnosis of diabetes?  No    Do you have a diagnosis of cystic fibrosis (CF)?  No    On a regular basis do you go 3 -5 days between bowel movements?  No    BMI > 40?  No    Preferred Pharmacy:    [facility]/pharmacy #[id number] - [place], [place] - [address]  [address]  [facility] [address]  Phone: [phone] Fax: [phone]      Final Scheduling Details   Colonoscopy prep sent?  Standard MiraLAX    Procedure scheduled  Colonoscopy    Surgeon:  CAROLINA     Date of procedure:  1/8/24     Pre-OP / PAC:   No - Not required for this site.    Location  [facility] - Per order.    Sedation   MAC/Deep Sedation  Per location.      Patient Reminders:   You will receive a call from a Nurse to review instructions and health history.  This assessment must be completed prior to your procedure.  Failure to complete the Nurse assessment may result in the procedure being cancelled.      On the day of your procedure, please designate an adult(s) who can drive you home stay with you for the next 24 hours. The medicines used in the exam will make you sleepy. You will not be able to drive.      You cannot take public transportation, ride share services, or non-medical taxi service without a responsible caregiver.  Medical transport services are allowed with the requirement that a responsible caregiver will receive you at your destination.  We require that drivers and caregivers are confirmed prior to your procedure.

## 2023-12-01 ENCOUNTER — TELEPHONE (OUTPATIENT)
Dept: GASTROENTEROLOGY | Facility: CLINIC | Age: 73
End: 2023-12-01
Payer: COMMERCIAL

## 2023-12-01 NOTE — TELEPHONE ENCOUNTER
Caller:   Reason for Reschedule/Cancellation (please be detailed, any staff messages or encounters to note?): MADE APPT ELSEWHERE       Prior to reschedule please review:  Ordering Provider:     HERBERTH FERNANDEZ     Sedation per order: CS  Does patient have any ASC Exclusions, please identify?:       Notes on Cancelled Procedure:  Procedure: Lower Endoscopy [Colonoscopy]   Date: 1/8  Location: [facility]; [address] [place] [address], [address], [place] [address]  Surgeon: CAROLINA      Rescheduled: No  Procedure:    Date:   Location:   Surgeon:   Sedation Level Scheduled  ,  Reason for Sedation Level   Prep/Instructions updated and sent:        Send In - basket message to Panc - Aamir Pool if [facility]  procedure is canceled or rescheduled: [ N/A, YES or NO]

## 2024-01-19 DIAGNOSIS — E78.5 HYPERLIPIDEMIA LDL GOAL <100: Primary | ICD-10-CM

## 2024-01-19 RX ORDER — PRAVASTATIN SODIUM 10 MG
10 TABLET ORAL DAILY
Qty: 90 TABLET | Refills: 0 | Status: SHIPPED | OUTPATIENT
Start: 2024-01-19 | End: 2024-04-25

## 2024-01-19 RX ORDER — PRAVASTATIN SODIUM 10 MG
10 TABLET ORAL DAILY
Qty: 90 TABLET | Refills: 0 | Status: SHIPPED | OUTPATIENT
Start: 2024-01-19 | End: 2024-01-19

## 2024-01-19 NOTE — TELEPHONE ENCOUNTER
FYI - Status Update    Who is Calling: patient    Update: pt having reaction to this med, has been taking it for a month or month and a half, thinking once her system got used to it, but it is increasingly making her dizzy and nauseous. Pt wondering if there is a different med she could take instead to alleviate these symptoms. Declined triage regarding symptoms.    Pt has stopped taking it sometime in the last 3 days because she doesn't want to experience these side effects, and has experienced relief from being off it.     atorvastatin (LIPITOR) 10 MG tablet Take 1 tablet (10 mg) by mouth daily       Does caller want a call/response back: Yes     Could we send this information to you in ERPLY or would you prefer to receive a phone call?:   Patient would prefer a phone call     Okay to leave a detailed message?: Yes at Cell number on file:    Telephone Information:   Mobile [phone]

## 2024-01-19 NOTE — TELEPHONE ENCOUNTER
I sent a different statin medication called pravastatin to the pharmacy.  This is not quite strong but tends to be better tolerated.    She should stop the atorvastatin and start the pravastatin.    Thanks    KATIE

## 2024-01-19 NOTE — TELEPHONE ENCOUNTER
Noted. RN called patient and reviewed change of medication.   She would like it sent to [facility].  Cued up to be sent.     Kellie Enrique RN on 1/19/2024 at 1:19 PM

## 2024-01-31 ENCOUNTER — TRANSFERRED RECORDS (OUTPATIENT)
Dept: HEALTH INFORMATION MANAGEMENT | Facility: CLINIC | Age: 74
End: 2024-01-31
Payer: COMMERCIAL

## 2024-04-09 PROBLEM — K63.5 POLYP OF COLON: Status: ACTIVE | Noted: 2024-02-01

## 2024-04-09 RX ORDER — IBUPROFEN 200 MG
TABLET ORAL EVERY 6 HOURS
COMMUNITY
Start: 2024-01-31

## 2024-04-09 RX ORDER — GINGER ROOT/GINGER ROOT EXT 262.5 MG
CAPSULE ORAL
COMMUNITY
Start: 2024-01-31

## 2024-04-16 ENCOUNTER — OFFICE VISIT (OUTPATIENT)
Dept: FAMILY MEDICINE | Facility: CLINIC | Age: 74
End: 2024-04-16
Payer: COMMERCIAL

## 2024-04-16 DIAGNOSIS — J32.9 SINUSITIS, UNSPECIFIED CHRONICITY, UNSPECIFIED LOCATION: Primary | ICD-10-CM

## 2024-04-16 DIAGNOSIS — E66.01 MORBID OBESITY (H): ICD-10-CM

## 2024-04-16 DIAGNOSIS — M54.32 BILATERAL SCIATICA: ICD-10-CM

## 2024-04-16 DIAGNOSIS — M54.31 BILATERAL SCIATICA: ICD-10-CM

## 2024-04-16 LAB
ANION GAP SERPL CALCULATED.3IONS-SCNC: 12 MMOL/L (ref 7–15)
BUN SERPL-MCNC: 24.2 MG/DL (ref 8–23)
CALCIUM SERPL-MCNC: 9.9 MG/DL (ref 8.8–10.2)
CHLORIDE SERPL-SCNC: 103 MMOL/L (ref 98–107)
CHOLEST SERPL-MCNC: 213 MG/DL
CREAT SERPL-MCNC: 0.92 MG/DL (ref 0.51–0.95)
CRP SERPL-MCNC: <3 MG/L
DEPRECATED HCO3 PLAS-SCNC: 28 MMOL/L (ref 22–29)
EGFRCR SERPLBLD CKD-EPI 2021: 65 ML/MIN/1.73M2
ERYTHROCYTE [DISTWIDTH] IN BLOOD BY AUTOMATED COUNT: 12.8 % (ref 10–15)
ERYTHROCYTE [SEDIMENTATION RATE] IN BLOOD BY WESTERGREN METHOD: 18 MM/HR (ref 0–30)
FASTING STATUS PATIENT QL REPORTED: YES
GLUCOSE SERPL-MCNC: 100 MG/DL (ref 70–99)
HCT VFR BLD AUTO: 39.7 % (ref 35–47)
HDLC SERPL-MCNC: 46 MG/DL
HGB BLD-MCNC: 13.1 G/DL (ref 11.7–15.7)
LDLC SERPL CALC-MCNC: 129 MG/DL
MCH RBC QN AUTO: 30 PG (ref 26.5–33)
MCHC RBC AUTO-ENTMCNC: 33 G/DL (ref 31.5–36.5)
MCV RBC AUTO: 91 FL (ref 78–100)
NONHDLC SERPL-MCNC: 167 MG/DL
PLATELET # BLD AUTO: 304 10E3/UL (ref 150–450)
POTASSIUM SERPL-SCNC: 4 MMOL/L (ref 3.4–5.3)
RBC # BLD AUTO: 4.37 10E6/UL (ref 3.8–5.2)
SODIUM SERPL-SCNC: 143 MMOL/L (ref 135–145)
TRIGL SERPL-MCNC: 191 MG/DL
WBC # BLD AUTO: 7.8 10E3/UL (ref 4–11)

## 2024-04-16 PROCEDURE — 85652 RBC SED RATE AUTOMATED: CPT | Performed by: FAMILY MEDICINE

## 2024-04-16 PROCEDURE — 86140 C-REACTIVE PROTEIN: CPT | Performed by: FAMILY MEDICINE

## 2024-04-16 PROCEDURE — 36415 COLL VENOUS BLD VENIPUNCTURE: CPT | Performed by: FAMILY MEDICINE

## 2024-04-16 PROCEDURE — 99213 OFFICE O/P EST LOW 20 MIN: CPT | Performed by: FAMILY MEDICINE

## 2024-04-16 PROCEDURE — 80061 LIPID PANEL: CPT | Performed by: FAMILY MEDICINE

## 2024-04-16 PROCEDURE — 80048 BASIC METABOLIC PNL TOTAL CA: CPT | Performed by: FAMILY MEDICINE

## 2024-04-16 PROCEDURE — 85027 COMPLETE CBC AUTOMATED: CPT | Performed by: FAMILY MEDICINE

## 2024-04-16 RX ORDER — LORATADINE 10 MG/1
10 TABLET ORAL DAILY
Qty: 90 TABLET | Refills: 1 | Status: SHIPPED | OUTPATIENT
Start: 2024-04-16

## 2024-04-16 RX ORDER — RESPIRATORY SYNCYTIAL VIRUS VACCINE 120MCG/0.5
0.5 KIT INTRAMUSCULAR ONCE
Qty: 1 EACH | Refills: 0 | Status: CANCELLED | OUTPATIENT
Start: 2024-04-16 | End: 2024-04-16

## 2024-04-16 RX ORDER — MOMETASONE FUROATE MONOHYDRATE 50 UG/1
2 SPRAY, METERED NASAL DAILY
Qty: 17 G | Refills: 1 | Status: SHIPPED | OUTPATIENT
Start: 2024-04-16

## 2024-04-16 NOTE — PROGRESS NOTES
Assessment/Plan:    Kelly Campbell is a 73 year old female presenting for:    Sinusitis, unspecified chronicity, unspecified location  I encouraged the patient to stop taking the Sudafed.  I think this is overly drying.  She will try Nasonex nasal spray as well as a daily Claritin.  Augmentin sent to the pharmacy as well which she can try twice daily for the next 7 days.    Asked her to contact me in 7 to 10 days to let me know how she is doing.  Could have her see the ENT doctors as well for further evaluation but I suspect her foreign body sensation in her throat is likely due to postnasal drip.  - mometasone (NASONEX) 50 MCG/ACT nasal spray  Dispense: 17 g; Refill: 1  - amoxicillin-clavulanate (AUGMENTIN) 875-125 MG tablet  Dispense: 14 tablet; Refill: 0  - loratadine (CLARITIN) 10 MG tablet  Dispense: 90 tablet; Refill: 1  - Basic metabolic panel  (Ca, Cl, CO2, Creat, Gluc, K, Na, BUN)  - Lipid panel reflex to direct LDL Fasting  - CBC with platelets  - ESR: Erythrocyte sedimentation rate  - CRP, inflammation  - Basic metabolic panel  (Ca, Cl, CO2, Creat, Gluc, K, Na, BUN)  - Lipid panel reflex to direct LDL Fasting  - CBC with platelets  - ESR: Erythrocyte sedimentation rate  - CRP, inflammation    Bilateral sciatica  Referral for physical therapy placed.  I think patient would benefit from some stretching and exercises  - Physical Therapy  Referral    Morbid obesity: BMI of over 35 with concurrent hypertension.  Blood pressure has been at goal at home.  She will continue to monitor.  I suspect is elevated today she did not take her medications    There are no discontinued medications.        Chief Complaint:  Ear Problem and Sinus Problem        Subjective:   Kelly Campbell is a pleasant 73-year-old female who presents to the clinic today for concerns over her sinusitis, postnasal drip and tongue dryness.    Patient notes that about 3 to 4 months ago she began feeling she had a cold.  She was seen  at urgent care and they were able to flush her left ear which did seem to help some of her symptoms.  She continues to have a dripping and foreign body sensation in the back of her throat as well as some dark coloring on her tongue.  She states that she takes 1 Sudafed in the morning and 1 Sudafed at night and has been doing so for the last several months.    History of hypertension for which she states is usually well-controlled at home but is elevated here in clinic.  She did not take her blood pressure medications today as she wanted to be fasting for this appointment.    She also notes that she has had some pressure on the left side of her face and in her left temple there is been going on for several months that waxes and wanes.  No visual difficulties.  Sometimes it is a bit tender but it is not at all tender today.    Patient also notes that she has bilateral hip and buttock pain.  She notes that this started a bit higher in her back but is now moved down and radiating a bit down the back of her legs.  This is bilateral and comes and goes.    12 point review of systems completed and negative except for what has been described above    History   Smoking Status    Never   Smokeless Tobacco    Never         Current Outpatient Medications:     acetaminophen-caffeine (EXCEDRIN TENSION HEADACHE) 500-65 MG TABS, Take 1-2 tablets by mouth, Disp: , Rfl:     albuterol (PROAIR HFA/PROVENTIL HFA/VENTOLIN HFA) 108 (90 Base) MCG/ACT inhaler, , Disp: , Rfl:     amoxicillin (AMOXIL) 500 MG capsule, TAKE 4 CAPSULES BY MOUTH ONE HOUR BEFORE APPOINTMENT, Disp: 8 capsule, Rfl: 0    amoxicillin-clavulanate (AUGMENTIN) 875-125 MG tablet, Take 1 tablet by mouth 2 times daily, Disp: 14 tablet, Rfl: 0    atenolol (TENORMIN) 50 MG tablet, Take 1 tablet (50 mg) by mouth daily, Disp: 90 tablet, Rfl: 3    Calcium Carb-Cholecalciferol (CALTRATE 600+D3) 600-20 MG-MCG TABS, , Disp: , Rfl:     hydrochlorothiazide (HYDRODIURIL) 25 MG tablet,  "Take 1 tablet (25 mg) by mouth daily, Disp: 90 tablet, Rfl: 3    ibuprofen (ADVIL) 200 MG tablet, Take by mouth every 6 hours, Disp: , Rfl:     loratadine (CLARITIN) 10 MG tablet, Take 1 tablet (10 mg) by mouth daily, Disp: 90 tablet, Rfl: 1    mometasone (NASONEX) 50 MCG/ACT nasal spray, Spray 2 sprays into both nostrils daily, Disp: 17 g, Rfl: 1    omeprazole (PRILOSEC) 20 MG DR capsule, Take 1 capsule (20 mg) by mouth daily, Disp: 90 capsule, Rfl: 3    pramipexole (MIRAPEX) 0.125 MG tablet, Take 1-2 tablets (0.125-0.25 mg) by mouth At Bedtime, Disp: 180 tablet, Rfl: 3    pravastatin (PRAVACHOL) 10 MG tablet, Take 1 tablet (10 mg) by mouth daily, Disp: 90 tablet, Rfl: 0      Objective:  Vitals:    04/16/24 1343 04/16/24 1435   BP: (!) 162/90 134/84   Pulse: 69    Resp: 12    Temp: (!) 96.4  F (35.8  C)    TempSrc: Tympanic    SpO2: 96%    Weight: 89.5 kg (197 lb 6 oz)    Height: 1.58 m (5' 2.21\")        Body mass index is 35.86 kg/m .    Vital signs reviewed and stable  General: No acute distress  Psych: Appropriate affect  HEENT: dry mucous membranes, pupils equal, round, reactive to light and accomodation, tympanic membranes are pearly grey bilaterally, tongue is slightly hyperpigmented in the center  Lymph: no cervical or supraclavicular lymphadenopathy  Cardiovascular: regular rate and rhythm with no murmur  Pulmonary: clear to auscultation bilaterally with no wheeze  Abdomen: soft, non tender, non distended with normo-active bowel sounds  Extremities: warm and well perfused with no edema  Skin: warm and dry with no rash         This note has been dictated and transcribed using voice recognition software.   Any errors in transcription are unintentional and inherent to the software.  Answers submitted by the patient for this visit:  General Questionnaire (Submitted on 4/9/2024)  Chief Complaint: Chronic problems general questions HPI Form  What is the reason for your visit today? : Ear ache, and to check my " tongue and roof of mouth for abnormally.  How many servings of fruits and vegetables do you eat daily?: 2-3  On average, how many sweetened beverages do you drink each day (Examples: soda, juice, sweet tea, etc.  Do NOT count diet or artificially sweetened beverages)?: 3  How many minutes a day do you exercise enough to make your heart beat faster?: 9 or less  How many days a week do you exercise enough to make your heart beat faster?: 4  How many days per week do you miss taking your medication?: 0

## 2024-04-17 VITALS
TEMPERATURE: 96.4 F | HEART RATE: 69 BPM | WEIGHT: 197.38 LBS | DIASTOLIC BLOOD PRESSURE: 84 MMHG | SYSTOLIC BLOOD PRESSURE: 134 MMHG | HEIGHT: 62 IN | BODY MASS INDEX: 36.32 KG/M2 | OXYGEN SATURATION: 96 % | RESPIRATION RATE: 12 BRPM

## 2024-04-23 ENCOUNTER — THERAPY VISIT (OUTPATIENT)
Dept: PHYSICAL THERAPY | Facility: REHABILITATION | Age: 74
End: 2024-04-23
Payer: COMMERCIAL

## 2024-04-23 DIAGNOSIS — M54.42 BILATERAL LOW BACK PAIN WITH LEFT-SIDED SCIATICA: Primary | ICD-10-CM

## 2024-04-23 DIAGNOSIS — M54.41 BILATERAL LOW BACK PAIN WITH RIGHT-SIDED SCIATICA: ICD-10-CM

## 2024-04-23 PROCEDURE — 97161 PT EVAL LOW COMPLEX 20 MIN: CPT | Mod: GP | Performed by: PHYSICAL THERAPIST

## 2024-04-23 PROCEDURE — 97110 THERAPEUTIC EXERCISES: CPT | Mod: GP | Performed by: PHYSICAL THERAPIST

## 2024-04-23 NOTE — PROGRESS NOTES
PHYSICAL THERAPY EVALUATION  Type of Visit: Evaluation    See electronic medical record for Abuse and Falls Screening details.    Subjective       Presenting condition or subjective complaint:    Date of onset: 24    Relevant medical history:     Dates & types of surgery:      Prior diagnostic imaging/testing results:       Prior therapy history for the same diagnosis, illness or injury:        Patient reports history of chronic low back pain, recurrent off/on over several years. This episode of pain began shortly before Humph[place], and has progressively worsened up until now. Patient is now having low back pain with referred/radiation bilateral posterior thigh. Patient has been taking ibuprofen. Patient has been waking up due to pain.     History of bilateral knee replacement, which patient feels they are doing very well.     Standing is more painful than sitting. Lifting/carrying items up the stairs such as a light bag of toiletry items.     Physical activity: lives in 3 bedroom townhouse, 15 steps to get into bedroom in the evening. Household activities/cleaning. Mailbox is across the street. Sister lives across street and can walk here no difficulty, another sister lives 1/4 mile and will drive there and feels like she should be moving.     Patient finds she holds her breath during episodes of increased pain    Prior Level of Function  Able to walk short distances through grocery store without needing shopping cart. Able to walk 1/4 mile to [facility] without being limited due to pain      Living Environment  Social support:     Type of home:     Stairs to enter the home:         Ramp:     Stairs inside the home:         Help at home:    Equipment owned:       Employment:      Hobbies/Interests:      Patient goals for therapy:  walk short distances through a grocery store without feeling the need to push a cart for support    Pain assessment: Location: bilateral low back/Ratin/10 while sitting during  "subjective. Worse pain other days where moving around is painful and needs to take 3 ibuprofen (without providing pain number)     Objective   LUMBAR SPINE EVALUATION  PAIN:   INTEGUMENTARY (edema, incisions):   POSTURE:  lateral shift towards left.   GAIT:   Weightbearing Status:   Assistive Device(s):   Gait Deviations:  slight antalgic gait during left stance phase  BALANCE/PROPRIOCEPTION:   WEIGHTBEARING ALIGNMENT:   NON-WEIGHTBEARING ALIGNMENT:    ROM:   (Degrees) Left AROM Left PROM  Right AROM Right PROM   Hip Flexion WFL  WFL    Hip Extension Mod limitations  Mod limitations    Hip Abduction       Hip Adduction       Hip Internal Rotation       Hip External Rotation       Knee Flexion WFL  WFL    Knee Extension WFL  WFL    Lumbar Side glide     Lumbar Flexion WFL (feels relief)   Lumbar Extension Limtied by 50% (spasm in midline lower back, \"otherwise feels okay\")   Pain:   End feel:   PELVIC/SI SCREEN:   STRENGTH:     MYOTOMES:    Left Right   T12-L3 (Hip Flexion) 4 4   L2-4 (Quads)  5 5   L4 (Ankle DF) 5 5   L5 (Great Toe Ext) 4 4   S1 (Toe Raise) 5 4+     DTR S:   CORD SIGNS:   DERMATOMES:   NEURAL TENSION:   FLEXIBILITY:   LUMBAR/HIP Special Tests:    PELVIS/SI SPECIAL TESTS:   FUNCTIONAL TESTS:   PALPATION:   SPINAL SEGMENTAL CONCLUSIONS:       10 meter walk test: 7.94 seconds    30 second sit to stand: 6 reps    Assessment & Plan   CLINICAL IMPRESSIONS  Medical Diagnosis: M54.31, M54.32 (ICD-10-CM) - Bilateral sciatica  4/16/2024    Treatment Diagnosis: chronic low back pain with mobility and muscle power deficits   Impression/Assessment: Patient is a 73 year old female with chief complaints of chronic low back pain with mobility and muscle power deficits.  The following significant findings have been identified: Pain, Decreased ROM/flexibility, Decreased joint mobility, Decreased strength, Impaired balance, Impaired gait, Impaired muscle performance, Decreased activity tolerance, and Impaired posture. " These impairments interfere with their ability to perform recreational activities, household chores, household mobility, and community mobility as compared to previous level of function. Patient will benefit from skilled physical therapy to address impairments and functional limitations in order to achieve their goals.       Clinical Decision Making (Complexity):  Clinical Presentation: Stable/Uncomplicated  Clinical Presentation Rationale: based on medical and personal factors listed in PT evaluation  Clinical Decision Making (Complexity): Low complexity    PLAN OF CARE  Treatment Interventions:  Interventions: Gait Training, Manual Therapy, Neuromuscular Re-education, Therapeutic Activity, Therapeutic Exercise, Self-Care/Home Management    Long Term Goals     PT Goal 1  Goal Identifier: HEP  Goal Description: Patient will demonstrate >50% compliance with home exercise program to promote independence and progress towards  Target Date: 07/16/24  PT Goal 2  Goal Identifier: 30 sec sit to stand  Goal Description: Patient will improve 30 sec sit to stand to at least 10 reps to reflect age normative strength values for improved tolerance with mobility  Target Date: 07/16/24  PT Goal 3  Goal Identifier: gait speed  Goal Description: Patient will improve habitual gait speed reflected by an improvement of at least 0.2 meters per second compared to evaluation to imrpove tolerance with community mobility      Frequency of Treatment: weekly  Duration of Treatment: 12 weeks    Recommended Referrals to Other Professionals:   Education Assessment:   Learner/Method: Patient    Risks and benefits of evaluation/treatment have been explained.   Patient/Family/caregiver agrees with Plan of Care.     Evaluation Time:     PT Eval, Low Complexity Minutes (52347): 20       Signing Clinician: Venu Louis PT      [facility]                                                                                    OUTPATIENT PHYSICAL THERAPY      PLAN OF TREATMENT FOR OUTPATIENT REHABILITATION   Patient's Last Name, First Name, Kelly Bermudez YOB: 1950   Provider's Name   [facility]   Medical Record No.  [id number]     Onset Date: 04/16/24  Start of Care Date: 04/23/24     Medical Diagnosis:  M54.31, M54.32 (ICD-10-CM) - Bilateral sciatica  4/16/2024      PT Treatment Diagnosis:  chronic low back pain with mobility and muscle power deficits Plan of Treatment  Frequency/Duration: weekly/ 12 weeks    Certification date from 04/23/24 to 07/16/24         See note for plan of treatment details and functional goals     Venu Louis, PT                         I CERTIFY THE NEED FOR THESE SERVICES FURNISHED UNDER        THIS PLAN OF TREATMENT AND WHILE UNDER MY CARE     (Physician attestation of this document indicates review and certification of the therapy plan).              Referring Provider:  Mimi Mcclure    Initial Assessment  See Epic Evaluation- Start of Care Date: 04/23/24

## 2024-04-25 DIAGNOSIS — E78.5 HYPERLIPIDEMIA LDL GOAL <100: ICD-10-CM

## 2024-04-25 RX ORDER — PRAVASTATIN SODIUM 10 MG
10 TABLET ORAL DAILY
Qty: 90 TABLET | Refills: 3 | Status: SHIPPED | OUTPATIENT
Start: 2024-04-25

## 2024-08-14 NOTE — TELEPHONE ENCOUNTER
New Patient Progress Note     CC: Endocrinology follow up visit    Impression & Plan:    Problem List Items Addressed This Visit       Atrial fibrillation with RVR (HCC)     Believed to be related to thyrotoxicosis. Initially dx in 2020. Referral to cardiology provided. Continue metoprolol tartrate 25 mg daily.          Relevant Medications    metoprolol tartrate (LOPRESSOR) 25 mg tablet    Other Relevant Orders    Ambulatory Referral to Cardiology    Graves disease - Primary     Counseled on basic pathophysiology of Graves disease as well as treatment options including continued methimazole therapy vs definitive therapy with ROQUE or thyroidectomy. Counseled on TSH vs free t4 goals. Check TSH, free T4, and TrAb for baseline. Once labs are reviewed, will make recommendations on methimazole dose. Currently taking 10 mg Monday-Saturday with 20 mg on Sunday.          Relevant Medications    methimazole (TAPAZOLE) 10 mg tablet    metoprolol tartrate (LOPRESSOR) 25 mg tablet    Other Relevant Orders    TSH, 3rd generation    T4, free    TRAb (TSH Receptor Binding Antibody)    Hyperparathyroidism (HCC)    Relevant Orders    Vitamin D 25 hydroxy    PTH, intact    Comprehensive metabolic panel    Primary hypertension     /80. Continue 10 mg of lisinopril daily.          Relevant Medications    metoprolol tartrate (LOPRESSOR) 25 mg tablet    lisinopril (ZESTRIL) 10 mg tablet    Family history of diabetes mellitus     Check HgA1C.          Relevant Orders    Hemoglobin A1C       History of Present Illness:     Mechelle Joseph is a 60 y.o. female with hyperthyroidism secondary to Graves' disease presenting to the office today to establish care.  Last endocrinology outpatient visit 11/8/2023.  Notes reviewed.    Hypothyroidism initially diagnosed to being hospitalized in August 2020.  Symptoms included weight loss, anxiety, and occasional difficulty breathing for 3 months.  She was diagnosed with A-fib and hyperthyroidism  Routing refill request to provider for review/approval because:  Medication is reported/historical    William Vu RN     with elevated TSI.,  She was started on methimazole.  M-Zole dose has been adjusted multiple times.    In 2022, patient was found to be vitamin D deficient with an elevated PTH.  In 2023, despite normalized vitamin D, PTH remained elevated at 88.7 pg/mL.  At that time, patient had cut out dairy and serum calcium was noted to be on the low side of normal.  By November 2023, PTH improved with normal serum calcium and normal vitamin D.    Available is from 11/1/2023.  At that time, TSH was undetectable with a normal free T4 of 1.28 and total T3 of 1.07.     Currently taking methimazole 10 mg Monday-Saturday and 20 mg on Sunday.     Patient Active Problem List   Diagnosis    Atrial fibrillation with RVR (HCC)    Chronic diastolic (congestive) heart failure (HCC)    Graves disease    Hyperparathyroidism (HCC)    Primary hypertension    Thyrotoxicosis with diffuse goiter without thyrotoxic crisis or storm    Vitamin D deficiency    Family history of diabetes mellitus      Past Medical History:   Diagnosis Date    A-fib (HCC)     Hyperthyroidism     Secondary hypertension       Past Surgical History:   Procedure Laterality Date    UTERINE FIBROID EMBOLIZATION Right       Family History   Problem Relation Age of Onset    Diabetes unspecified Maternal Grandfather      Social History     Tobacco Use    Smoking status: Never     Passive exposure: Never    Smokeless tobacco: Never   Substance Use Topics    Alcohol use: Never     Not on File    Current Outpatient Medications:     lisinopril (ZESTRIL) 10 mg tablet, Take 10 mg by mouth daily, Disp: , Rfl:     methimazole (TAPAZOLE) 10 mg tablet, Take 10 mg by mouth 1 (one) time Sunday takes 20mg, Disp: , Rfl:     metoprolol tartrate (LOPRESSOR) 25 mg tablet, Take 25 mg by mouth daily, Disp: , Rfl:     Vitamin D, Ergocalciferol, 06046 units CAPS, Take 50,000 Units by mouth every 7 days, Disp: , Rfl:     Review of Systems   Constitutional:  Positive for fatigue. Negative for  "activity change, appetite change and unexpected weight change.   HENT:  Negative for dental problem, sore throat, trouble swallowing and voice change.    Eyes:  Negative for visual disturbance.   Respiratory:  Negative for cough, chest tightness and shortness of breath.    Cardiovascular:  Positive for palpitations (intermittent). Negative for chest pain and leg swelling.   Gastrointestinal:  Negative for constipation, diarrhea, nausea and vomiting.   Endocrine: Positive for heat intolerance. Negative for cold intolerance, polydipsia, polyphagia and polyuria.   Genitourinary:  Negative for frequency.   Musculoskeletal:  Negative for arthralgias, back pain, gait problem and myalgias.   Skin:  Negative for wound.   Allergic/Immunologic: Negative for environmental allergies and food allergies.   Neurological:  Positive for tremors (intermittent). Negative for dizziness, weakness, light-headedness, numbness and headaches.   Psychiatric/Behavioral:  Negative for decreased concentration, dysphoric mood and sleep disturbance. The patient is not nervous/anxious.        Physical Exam:  Body mass index is 34.12 kg/m².  /80 (BP Location: Left arm, Patient Position: Sitting, Cuff Size: Standard)   Pulse 57   Temp 99.2 °F (37.3 °C) (Temporal)   Resp 16   Ht 5' 4\" (1.626 m)   Wt 90.2 kg (198 lb 12.8 oz)   SpO2 97%   BMI 34.12 kg/m²    Wt Readings from Last 3 Encounters:   08/14/24 90.2 kg (198 lb 12.8 oz)       Physical Exam  Vitals reviewed.   Constitutional:       General: She is not in acute distress.     Appearance: She is well-developed. She is obese. She is not ill-appearing.   HENT:      Head: Normocephalic and atraumatic.   Eyes:      Pupils: Pupils are equal, round, and reactive to light.   Neck:      Thyroid: No thyroid mass, thyromegaly or thyroid tenderness.   Cardiovascular:      Rate and Rhythm: Normal rate.      Pulses: Normal pulses.   Pulmonary:      Effort: Pulmonary effort is normal. "   Musculoskeletal:      Cervical back: Normal range of motion and neck supple.      Right lower leg: No edema.      Left lower leg: No edema.   Lymphadenopathy:      Cervical: No cervical adenopathy.   Skin:     General: Skin is warm and dry.      Capillary Refill: Capillary refill takes less than 2 seconds.   Neurological:      Mental Status: She is alert and oriented to person, place, and time.      Gait: Gait normal.   Psychiatric:         Mood and Affect: Mood normal.         Behavior: Behavior normal.         Labs:       Discussed with the patient and all questioned fully answered. She will call me if any problems arise.    Follow-up appointment in 6 months.     Counseled patient on diagnostic results, prognosis, risk and benefit of treatment options, instruction for management, importance of treatment compliance, Risk  factor reduction and impressions    DONOVAN Moscoso

## 2024-09-03 DIAGNOSIS — I10 BENIGN ESSENTIAL HYPERTENSION: ICD-10-CM

## 2024-09-05 RX ORDER — ATENOLOL 50 MG/1
50 TABLET ORAL DAILY
Qty: 90 TABLET | Refills: 0 | Status: SHIPPED | OUTPATIENT
Start: 2024-09-05

## 2024-09-10 ENCOUNTER — OFFICE VISIT (OUTPATIENT)
Dept: FAMILY MEDICINE | Facility: CLINIC | Age: 74
End: 2024-09-10
Payer: COMMERCIAL

## 2024-09-10 VITALS
RESPIRATION RATE: 16 BRPM | HEART RATE: 72 BPM | TEMPERATURE: 96.9 F | BODY MASS INDEX: 36.5 KG/M2 | DIASTOLIC BLOOD PRESSURE: 80 MMHG | WEIGHT: 198.38 LBS | SYSTOLIC BLOOD PRESSURE: 128 MMHG | OXYGEN SATURATION: 95 % | HEIGHT: 62 IN

## 2024-09-10 DIAGNOSIS — I10 ESSENTIAL (PRIMARY) HYPERTENSION: ICD-10-CM

## 2024-09-10 DIAGNOSIS — E66.01 MORBID OBESITY (H): ICD-10-CM

## 2024-09-10 DIAGNOSIS — Z00.00 ENCOUNTER FOR MEDICARE ANNUAL WELLNESS EXAM: Primary | ICD-10-CM

## 2024-09-10 PROCEDURE — G0439 PPPS, SUBSEQ VISIT: HCPCS | Performed by: FAMILY MEDICINE

## 2024-09-10 RX ORDER — HYDROCHLOROTHIAZIDE 25 MG/1
25 TABLET ORAL DAILY
Qty: 90 TABLET | Refills: 3 | Status: SHIPPED | OUTPATIENT
Start: 2024-09-10

## 2024-09-10 NOTE — PROGRESS NOTES
"Preventive Care Visit  [facility] CARLOS Mcclure MD, Family Medicine  Sep 10, 2024      Assessment & Plan     Encounter for Medicare annual wellness exam  She continues to have some issues with a scratchiness in her throat more towards the end of the day.  Offered referral to ENT but not interested.  She will trial Xyzal to see if this is effective.    Essential (primary) hypertension  Refill blood pressure medication given today.  Blood pressure is at goal.  - hydrochlorothiazide (HYDRODIURIL) 25 MG tablet; Take 1 tablet (25 mg) by mouth daily.    Morbid obesity (H)  Discussed weight for quite a bit today.  Discussed intermittent fasting and information given regarding this.  Also discussed weight watchers.  [facility] and she will consider.                BMI  Estimated body mass index is 36.51 kg/m  as calculated from the following:    Height as of this encounter: 1.57 m (5' 1.81\").    Weight as of this encounter: 90 kg (198 lb 6 oz).   Weight management plan: Discussed healthy diet and exercise guidelines    Counseling  Appropriate preventive services were addressed with this patient via screening, questionnaire, or discussion as appropriate for fall prevention, nutrition, physical activity, Tobacco-use cessation, social engagement, weight loss and cognition.  Checklist reviewing preventive services available has been given to the patient.  Reviewed patient's diet, addressing concerns and/or questions.   She is at risk for lack of exercise and has been provided with information to increase physical activity for the benefit of her well-being.   Patient reported safety concerns were addressed today.The patient was provided with written information regarding signs of hearing loss.           Jeri Mendoza is a 73 year old, presenting for the following:  Medicare Visit (AWV-Px/Not fasting for labs today) and Cough (Dry cough- throat feels like " "she has \"crumbs\" stuck in her throat- not constant but does happen daily- has been taking allergy meds to try and help with it- has been going on for months now)          Health Care Directive  Patient has a Health Care Directive on file  Advance care planning document is on file and is current.    HPI  Overall doing well.  History of some scratchiness in her throat with intermittent coughing.  Notes that she takes a Claritin in the morning and this seems to help.  Notes that towards the end of the day her voice will get a bit wavery.    Patient is frustrated with her weight.  She wonders about medication or advice for weight management.  Not doing anything in particular at this point.            9/6/2024   General Health   How would you rate your overall physical health? (!) FAIR   Feel stress (tense, anxious, or unable to sleep) To some extent      (!) STRESS CONCERN      9/6/2024   Nutrition   Diet: Regular (no restrictions)            9/6/2024   Exercise   Days per week of moderate/strenous exercise 2 days   Average minutes spent exercising at this level 10 min      (!) EXERCISE CONCERN      9/6/2024   Social Factors   Frequency of gathering with friends or relatives Twice a week   Worry food won't last until get money to buy more No   Food not last or not have enough money for food? No   Do you have housing? (Housing is defined as stable permanent housing and does not include staying ouside in a car, in a tent, in an abandoned building, in an overnight shelter, or couch-surfing.) Yes   Are you worried about losing your housing? No   Lack of transportation? No   Unable to get utilities (heat,electricity)? No            9/10/2024   Fall Risk   Fallen 2 or more times in the past year? No   Trouble with walking or balance? No             9/6/2024   Activities of Daily Living- Home Safety   Needs help with the following daily activites None of the above   Safety concerns in the home No grab bars in the bathroom    "         9/6/2024   Dental   Dentist two times every year? Yes            9/6/2024   Hearing Screening   Hearing concerns? (!) IT'S HARD TO FOLLOW A CONVERSATION IN A NOISY RESTAURANT OR CROWDED ROOM.            9/6/2024   Driving Risk Screening   Patient/family members have concerns about driving No            9/6/2024   General Alertness/Fatigue Screening   Have you been more tired than usual lately? No            9/6/2024   Urinary Incontinence Screening   Bothered by leaking urine in past 6 months No            9/6/2024   TB Screening   Were you born outside of the [place]? No            Today's PHQ-2 Score:       9/10/2024    10:24 AM   PHQ-2 ( 1999 [employer])   Q1: Little interest or pleasure in doing things 0   Q2: Feeling down, depressed or hopeless 0   PHQ-2 Score 0   Q1: Little interest or pleasure in doing things Not at all   Q2: Feeling down, depressed or hopeless Not at all   PHQ-2 Score 0           9/6/2024   Substance Use   Alcohol more than 3/day or more than 7/wk No   Do you have a current opioid prescription? No   How severe/bad is pain from 1 to 10? 4/10   Do you use any other substances recreationally? No        Social History     Tobacco Use    Smoking status: Never    Smokeless tobacco: Never           9/19/2023   LAST FHS-7 RESULTS   1st degree relative breast or ovarian cancer No   Any relative bilateral breast cancer No   Any male have breast cancer No   Any ONE woman have BOTH breast AND ovarian cancer No   Any woman with breast cancer before 50yrs No   2 or more relatives with breast AND/OR ovarian cancer No   2 or more relatives with breast AND/OR bowel cancer No               ASCVD Risk   The 10-year ASCVD risk score (Maurilio SAM, et al., 2019) is: 21.1%    Values used to calculate the score:      Age: 73 years      Sex: Female      Is Non- : No      Diabetic: No      Tobacco smoker: No      Systolic Blood Pressure: 128 mmHg      Is BP treated: Yes      HDL  "Cholesterol: 46 mg/dL      Total Cholesterol: 213 mg/dL            Reviewed and updated as needed this visit by Provider                    Past Medical History:   Diagnosis Date    Arthritis     Hypertension     Osteopenia      Current providers sharing in care for this patient include:  Patient Care Team:  Mimi Mcclure MD as PCP - General (Family Medicine)  Mimi Mcclure MD as Assigned PCP    The following health maintenance items are reviewed in Epic and correct as of today:  Health Maintenance   Topic Date Due    RSV VACCINE (1 - 1-dose 75+ series) Never done    INFLUENZA VACCINE (1) 09/01/2024    COVID-19 Vaccine (5 - 2023-24 season) 09/01/2024    ANNUAL REVIEW OF HM ORDERS  09/06/2024    LIPID  04/16/2025    MEDICARE ANNUAL WELLNESS VISIT  09/10/2025    FALL RISK ASSESSMENT  09/10/2025    GLUCOSE  04/16/2027    ADVANCE CARE PLANNING  09/20/2028    DTAP/TDAP/TD IMMUNIZATION (3 - Td or Tdap) 07/12/2031    DEXA  12/19/2034    HEPATITIS C SCREENING  Completed    PHQ-2 (once per calendar year)  Completed    Pneumococcal Vaccine: 65+ Years  Completed    ZOSTER IMMUNIZATION  Completed    HPV IMMUNIZATION  Aged Out    MENINGITIS IMMUNIZATION  Aged Out    RSV MONOCLONAL ANTIBODY  Aged Out    MAMMO SCREENING  Discontinued    COLORECTAL CANCER SCREENING  Discontinued         Review of Systems  Constitutional, HEENT, cardiovascular, pulmonary, GI, , musculoskeletal, neuro, skin, endocrine and psych systems are negative, except as otherwise noted.     Objective    Exam  /80   Pulse 72   Temp 96.9  F (36.1  C) (Tympanic)   Resp 16   Ht 1.57 m (5' 1.81\")   Wt 90 kg (198 lb 6 oz)   SpO2 95%   BMI 36.51 kg/m     Estimated body mass index is 36.51 kg/m  as calculated from the following:    Height as of this encounter: 1.57 m (5' 1.81\").    Weight as of this encounter: 90 kg (198 lb 6 oz).    Physical Exam  Objective:  Vital signs reviewed and stable  General: No acute distress  Psych: " Appropriate affect  HEENT: moist mucous membranes, pupils equal, round, reactive to light and accomodation, tympanic membranes are pearly grey bilaterally  Lymph: no cervical or supraclavicular lymphadenopathy  Cardiovascular: regular rate and rhythm with no murmur  Pulmonary: clear to auscultation bilaterally with no wheeze  Abdomen: soft, non tender, non distended with normo-active bowel sounds  Extremities: warm and well perfused with no edema  Skin: warm and dry with no rash          9/10/2024   Mini Cog   Clock Draw Score 2 Normal   3 Item Recall 3 objects recalled   Mini Cog Total Score 5                 Signed Electronically by: Mimi Mcclure MD

## 2024-09-10 NOTE — PATIENT INSTRUCTIONS
"Book - \"The Menopausal Reset\"    We discussed Wegovy today - weekly injection    Can consider Weight Watchers programs    Patient Education   Preventive Care Advice   This is general advice given by our system to help you stay healthy. However, your care team may have specific advice just for you. Please talk to your care team about your preventive care needs.  Nutrition  Eat 5 or more servings of fruits and vegetables each day.  Try wheat bread, brown rice and whole grain pasta (instead of white bread, rice, and pasta).  Get enough calcium and vitamin D. Check the label on foods and aim for 100% of the RDA (recommended daily allowance).  Lifestyle  Exercise at least 150 minutes each week  (30 minutes a day, 5 days a week).  Do muscle strengthening activities 2 days a week. These help control your weight and prevent disease.  No smoking.  Wear sunscreen to prevent skin cancer.  Have a dental exam and cleaning every 6 months.  Yearly exams  See your health care team every year to talk about:  Any changes in your health.  Any medicines your care team has prescribed.  Preventive care, family planning, and ways to prevent chronic diseases.  Shots (vaccines)   HPV shots (up to age 26), if you've never had them before.  Hepatitis B shots (up to age 59), if you've never had them before.  COVID-19 shot: Get this shot when it's due.  Flu shot: Get a flu shot every year.  Tetanus shot: Get a tetanus shot every 10 years.  Pneumococcal, hepatitis A, and RSV shots: Ask your care team if you need these based on your risk.  Shingles shot (for age 50 and up)  General health tests  Diabetes screening:  Starting at age 35, Get screened for diabetes at least every 3 years.  If you are younger than age 35, ask your care team if you should be screened for diabetes.  Cholesterol test: At age 39, start having a cholesterol test every 5 years, or more often if advised.  Bone density scan (DEXA): At age 50, ask your care team if you should " have this scan for osteoporosis (brittle bones).  Hepatitis C: Get tested at least once in your life.  STIs (sexually transmitted infections)  Before age 24: Ask your care team if you should be screened for STIs.  After age 24: Get screened for STIs if you're at risk. You are at risk for STIs (including HIV) if:  You are sexually active with more than one person.  You don't use condoms every time.  You or a partner was diagnosed with a sexually transmitted infection.  If you are at risk for HIV, ask about PrEP medicine to prevent HIV.  Get tested for HIV at least once in your life, whether you are at risk for HIV or not.  Cancer screening tests  Cervical cancer screening: If you have a cervix, begin getting regular cervical cancer screening tests starting at age 21.  Breast cancer scan (mammogram): If you've ever had breasts, begin having regular mammograms starting at age 40. This is a scan to check for breast cancer.  Colon cancer screening: It is important to start screening for colon cancer at age 45.  Have a colonoscopy test every 10 years (or more often if you're at risk) Or, ask your provider about stool tests like a FIT test every year or Cologuard test every 3 years.  To learn more about your testing options, visit:   .  For help making a decision, visit:   https://bit.ly/pi39309.  Prostate cancer screening test: If you have a prostate, ask your care team if a prostate cancer screening test (PSA) at age 55 is right for you.  Lung cancer screening: If you are a current or former smoker ages 50 to 80, ask your care team if ongoing lung cancer screenings are right for you.  For informational purposes only. Not to replace the advice of your health care provider. Copyright   2023 [employer]. All rights reserved. Clinically reviewed by the [facility]. [employer] 195598 - REV 01/24.  Learning About Activities of Daily Living  What are activities of daily living?      Activities of daily living (ADLs) are the basic self-care tasks you do every day. These include eating, bathing, dressing, and moving around.  As you age, and if you have health problems, you may find that it's harder to do some of these tasks. If so, your doctor can suggest ideas that may help.  To measure what kind of help you may need, your doctor will ask how well you are able to do ADLs. Let your doctor know if there are any tasks that you are having trouble doing. This is an important first step to getting help. And when you have the help you need, you can stay as independent as possible.  How will a doctor assess your ADLs?  Asking about ADLs is part of a routine health checkup your doctor will likely do as you age. Your health check might be done in a doctor's office, in your home, or at a hospital. The goal is to find out if you are having any problems that could make it hard to care for yourself or that make it unsafe for you to be on your own.  To measure your ADLs, your doctor will ask how hard it is for you to do routine tasks. Your doctor may also want to know if you have changed the way you do a task because of a health problem. Your doctor may watch how you:  Walk back and forth.  Keep your balance while you stand or walk.  Move from sitting to standing or from a bed to a chair.  Button or unbutton a shirt or sweater.  Remove and put on your shoes.  It's common to feel a little worried or anxious if you find you can't do all the things you used to be able to do. Talking with your doctor about ADLs is a way to make sure you're as safe as possible and able to care for yourself as well as you can. You may want to bring a caregiver, friend, or family member to your checkup. They can help you talk to your doctor.  Follow-up care is a key part of your treatment and safety. Be sure to make and go to all appointments, and call your doctor if you are having problems. It's also a good idea to know your test  results and keep a list of the medicines you take.  Current as of: October 24, 2023  Content Version: 14.1    0568-9527 [employer].   Care instructions adapted under license by your healthcare professional. If you have questions about a medical condition or this instruction, always ask your healthcare professional. [employer] disclaims any warranty or liability for your use of this information.    Hearing Loss: Care Instructions  Overview     Hearing loss is a sudden or slow decrease in how well you hear. It can range from slight to profound. Permanent hearing loss can occur with aging. It also can happen when you are exposed long-term to loud noise. Examples include listening to loud music, riding motorcycles, or being around other loud machines.  Hearing loss can affect your work and home life. It can make you feel lonely or depressed. You may feel that you have lost your independence. But hearing aids and other devices can help you hear better and feel connected to others.  Follow-up care is a key part of your treatment and safety. Be sure to make and go to all appointments, and call your doctor if you are having problems. It's also a good idea to know your test results and keep a list of the medicines you take.  How can you care for yourself at home?  Avoid loud noises whenever possible. This helps keep your hearing from getting worse.  Always wear hearing protection around loud noises.  Wear a hearing aid as directed.  A professional can help you pick a hearing aid that will work best for you.  You can also get hearing aids over the counter for mild to moderate hearing loss.  Have hearing tests as your doctor suggests. They can show whether your hearing has changed. Your hearing aid may need to be adjusted.  Use other devices as needed. These may include:  Telephone amplifiers and hearing aids that can connect to a television, stereo, radio, or microphone.  Devices that use lights or  "vibrations. These alert you to the doorbell, a ringing telephone, or a baby monitor.  Television closed-captioning. This shows the words at the bottom of the screen. Most new TVs can do this.  TTY (text telephone). This lets you type messages back and forth on the telephone instead of talking or listening. These devices are also called TDD. When messages are typed on the keyboard, they are sent over the phone line to a receiving TTY. The message is shown on a monitor.  Use text messaging, social media, and email if it is hard for you to communicate by telephone.  Try to learn a listening technique called speechreading. It is not lipreading. You pay attention to people's gestures, expressions, posture, and tone of voice. These clues can help you understand what a person is saying. Face the person you are talking to, and have them face you. Make sure the lighting is good. You need to see the other person's face clearly.  Think about counseling if you need help to adjust to your hearing loss.  When should you call for help?  Watch closely for changes in your health, and be sure to contact your doctor if:    You think your hearing is getting worse.     You have new symptoms, such as dizziness or nausea.   Where can you learn more?  Go to https://www.[employer].net/patiented  Enter R798 in the search box to learn more about \"Hearing Loss: Care Instructions.\"  Current as of: September 27, 2023               Content Version: 14.0    3188-0275 [employer].   Care instructions adapted under license by your healthcare professional. If you have questions about a medical condition or this instruction, always ask your healthcare professional. [employer] disclaims any warranty or liability for your use of this information.         "

## 2024-10-07 DIAGNOSIS — J32.9 SINUSITIS, UNSPECIFIED CHRONICITY, UNSPECIFIED LOCATION: ICD-10-CM

## 2024-10-10 RX ORDER — LORATADINE 10 MG/1
10 TABLET ORAL DAILY
Qty: 90 TABLET | Refills: 3 | Status: SHIPPED | OUTPATIENT
Start: 2024-10-10

## 2024-12-08 DIAGNOSIS — I10 BENIGN ESSENTIAL HYPERTENSION: ICD-10-CM

## 2024-12-09 RX ORDER — ATENOLOL 50 MG/1
50 TABLET ORAL DAILY
Qty: 90 TABLET | Refills: 2 | Status: SHIPPED | OUTPATIENT
Start: 2024-12-09

## 2024-12-16 ENCOUNTER — TRANSFERRED RECORDS (OUTPATIENT)
Dept: HEALTH INFORMATION MANAGEMENT | Facility: CLINIC | Age: 74
End: 2024-12-16
Payer: COMMERCIAL

## 2025-01-12 ASSESSMENT — SLEEP AND FATIGUE QUESTIONNAIRES
HOW LIKELY ARE YOU TO NOD OFF OR FALL ASLEEP WHILE LYING DOWN TO REST IN THE AFTERNOON WHEN CIRCUMSTANCES PERMIT: HIGH CHANCE OF DOZING
HOW LIKELY ARE YOU TO NOD OFF OR FALL ASLEEP IN A CAR, WHILE STOPPED FOR A FEW MINUTES IN TRAFFIC: WOULD NEVER DOZE
HOW LIKELY ARE YOU TO NOD OFF OR FALL ASLEEP WHILE SITTING QUIETLY AFTER LUNCH WITHOUT ALCOHOL: MODERATE CHANCE OF DOZING
HOW LIKELY ARE YOU TO NOD OFF OR FALL ASLEEP WHEN YOU ARE A PASSENGER IN A CAR FOR AN HOUR WITHOUT A BREAK: SLIGHT CHANCE OF DOZING
HOW LIKELY ARE YOU TO NOD OFF OR FALL ASLEEP WHILE SITTING AND READING: MODERATE CHANCE OF DOZING
HOW LIKELY ARE YOU TO NOD OFF OR FALL ASLEEP WHILE SITTING INACTIVE IN A PUBLIC PLACE: WOULD NEVER DOZE
HOW LIKELY ARE YOU TO NOD OFF OR FALL ASLEEP WHILE WATCHING TV: SLIGHT CHANCE OF DOZING
HOW LIKELY ARE YOU TO NOD OFF OR FALL ASLEEP WHILE SITTING AND TALKING TO SOMEONE: WOULD NEVER DOZE

## 2025-01-13 ENCOUNTER — VIRTUAL VISIT (OUTPATIENT)
Dept: SURGERY | Facility: CLINIC | Age: 75
End: 2025-01-13
Payer: COMMERCIAL

## 2025-01-13 VITALS — WEIGHT: 199.8 LBS | BODY MASS INDEX: 36.77 KG/M2 | HEIGHT: 62 IN

## 2025-01-13 DIAGNOSIS — K21.9 GASTROESOPHAGEAL REFLUX DISEASE WITHOUT ESOPHAGITIS: ICD-10-CM

## 2025-01-13 DIAGNOSIS — E66.01 MORBID OBESITY (H): Primary | ICD-10-CM

## 2025-01-13 DIAGNOSIS — Z86.69 HISTORY OF MIGRAINE HEADACHES: ICD-10-CM

## 2025-01-13 DIAGNOSIS — I10 BENIGN ESSENTIAL HTN: ICD-10-CM

## 2025-01-13 DIAGNOSIS — E78.00 HYPERCHOLESTEREMIA: ICD-10-CM

## 2025-01-13 DIAGNOSIS — R53.82 CHRONIC FATIGUE, UNSPECIFIED: ICD-10-CM

## 2025-01-13 PROBLEM — M19.071 PRIMARY OSTEOARTHRITIS OF RIGHT ANKLE: Status: ACTIVE | Noted: 2024-02-05

## 2025-01-13 PROCEDURE — G2211 COMPLEX E/M VISIT ADD ON: HCPCS | Performed by: FAMILY MEDICINE

## 2025-01-13 PROCEDURE — 98003 SYNCH AUDIO-VIDEO NEW HI 60: CPT | Performed by: FAMILY MEDICINE

## 2025-01-13 RX ORDER — TOPIRAMATE 50 MG/1
TABLET, FILM COATED ORAL
Qty: 90 TABLET | Refills: 0 | Status: SHIPPED | OUTPATIENT
Start: 2025-01-13

## 2025-01-13 ASSESSMENT — PAIN SCALES - GENERAL: PAINLEVEL_OUTOF10: MODERATE PAIN (5)

## 2025-01-13 NOTE — LETTER
"2025      Kelly Campbell  [address]  [place] [address]      Dear Colleague,    Thank you for referring your patient, Kelly Campbell, to the [facility] [place]. Please see a copy of my visit note below.    Virtual Visit Details    Type of service:  Video Visit   Video Start Time: 1:14 PM  Video End Time:2:07 PM- switched to telephone later due to technical issues    Originating Location (pt. Location): Home    Distant Location (provider location):  Off-site  Platform used for Video Visit: [facility] Medical Weight Management Consult    PATIENT:  Kelly Campbell  MRN:         [id number]  :         1950  HORACIO:         2025    Dear Dr. Mcclure,    I had the pleasure of seeing your patient, Kelly Campbell. Full intake/assessment was done to determine barriers to weight loss success and develop a treatment plan. Kelly Campbell is a 74 year old female interested in treatment of medical problems associated with excess weight. She has a height of 5' 2\", a weight of 199 lbs 12.8 oz, and the calculated Body mass index is 36.54 kg/m .    ASSESSMENT/PLAN:  1. Morbid obesity (H) (Primary)  We discussed healthy habits to assist with weight loss. She is going to work on decreasing her potatoes and bread and increasing vegetables. She will work on cutting back on soda. We discussed medication that may assist with weight loss. Topamax was prescribed. Risks/ benefits and possible side effects were discussed and questions were answered. Written information was given.      2. Benign essential HTN  This may improve with healthy habits and weight loss.     3. Gastroesophageal reflux disease without esophagitis  This may improve with healthy habits and weight loss.     4. Hypercholesteremia  This may improve with healthy habits and weight loss.      She has the following co-morbidities:        2025     8:17 AM   --   I have the following " "health issues associated with obesity High Blood Pressure    High Cholesterol    GERD (Reflux)    Stress Incontinence   I have the following symptoms associated with obesity Lower Extremity Swelling    Back Pain    Fatigue           1/13/2025     8:17 AM   Referring Provider   Please name the provider who referred you to Medical Weight Management  If you do not know, please answer \"I Don't Know\" Dr. Mimi Mcclure           1/13/2025     8:17 AM   Weight History   How concerned are you about your weight? Very Concerned   I became overweight After Pregnancy   The following factors have contributed to my weight gain A Health Crisis    Lack of Exercise    Genetic (Runs in the Family)   I have tried the following methods to lose weight Watching Portions or Calories    Weight Watchers   My lowest weight since age 16 was 100   My highest weight since age 16 was 201   The most weight I have ever lost was (lbs) 28   I have the following family history of obesity/being overweight My mother is overweight   How has your weight changed over the last year? Gained   How many pounds? 10           1/13/2025     8:17 AM   Diet Recall Review with Patient   If you do eat breakfast, what types of food do you eat? (Late because she sleep late) Eggs, meat, toast or fruitand toast, or a bowl of cereal- corn flakes or grape nuts.  Pancake and meat often on the weekends.   If you do eat supper, what types of food do you typically eat? Meat, potato, vegetables, sometimes desert   If you do snack, what types of food do you typically eat? Stays up late (often midnight or later) Popcorn or occasionally bagged chips or bread and peanut butter   How many glasses of juice do you drink in a typical day? 12 oz   How many of glasses of milk do you drink in a typical day? 2   If you do drink milk, what type? 2%   How many 8oz glasses of sugar containing drinks such as Jose-Aid/sweet tea do you drink in a day? 0   How many cans/bottles of sugar " pop/soda/tea/sports drinks do you drink in a day? 2-3 cans of soda   How many cans/bottles of diet pop/soda/tea or sports drink do you drink in a day? 0   How often do you have a drink of alcohol? Monthly or Less   If you do drink, how many drinks might you have in a day?    Water intake: 8 oz every other day 1 or 2           1/13/2025     8:17 AM   Eating Habits   Generally, my meals include foods like these bread, pasta, rice, potatoes, corn, crackers, sweet dessert, pop, or juice Almost Everyday   Generally, my meals include foods like these fried meats, brats, burgers, french fries, pizza, cheese, chips, or ice cream Once a Week   Eat fast food (like McDonalds, [employer]i, Taco Bell) Less Than Weekly   Eat at a buffet or sit-down restaurant Less Than Weekly   Eat most of my meals in front of the TV or computer A Few Times a Week   Often skip meals, eat at random times, have no regular eating times Almost Everyday   Rarely sit down for a meal but snack or graze throughout Less Than Weekly   Eat extra snacks between meals Less Than Weekly   Eat most of my food at the end of the day A Few Times a Week   Eat in the middle of the night or wake up at night to eat Never   Eat extra snacks to prevent or correct low blood sugar Never   Eat to prevent acid reflux or stomach pain Less Than Weekly   Worry about not having enough food to eat Never   I eat when I am depressed Less Than Weekly   I eat when I am stressed Less Than Weekly   I eat when I am bored Less Than Weekly   I eat when I am anxious Less Than Weekly   I eat when I am happy or as a reward Less Than Weekly   I feel hungry all the time even if I just have eaten Never   Feeling full is important to me Almost Everyday   I finish all the food on my plate even if I am already full Less Than Weekly   I can't resist eating delicious food or walk past the good food/smell Less Than Weekly   I eat/snack without noticing that I am eating Less Than Weekly   I eat when I  am preparing the meal Never   I eat more than usual when I see others eating Less Than Weekly   I have trouble not eating sweets, ice cream, cookies, or chips if they are around the house Less Than Weekly   I think about food all day Less Than Weekly   What foods, if any, do you crave? Sweets/Candy/Chocolate     Meals not prepared at home per week: 0-1        1/13/2025     8:17 AM   Amount of Food   I feel out of control when eating Monthly   I eat a large amount of food, like a loaf of bread, a box of cookies, a pint/quart of ice cream, all at once Never   I eat a large amount of food even when I am not hungry Never   I eat rapidly Never   I eat alone because I feel embarrassed and do not want others to see how much I have eaten Never   I eat until I am uncomfortably full Almost Everyday   I feel bad, disgusted, or guilty after I overeat Monthly           1/13/2025     8:17 AM   Activity/Exercise History   How much of a typical 12 hour day do you spend sitting? Half the Day   How much of a typical 12 hour day do you spend lying down? Less Than Half the Day   How much of a typical day do you spend walking/standing? Half the Day   How many hours (not including work) do you spend on the TV/Video Games/Computer/Tablet/Phone? 2-3 Hours   How many times a week are you active for the purpose of exercise? Once a Week, walks to her sister's house which is one block away   What keeps you from being more active? Pain- knees   Shortness of Breath   How many total minutes do you spend doing some activity for the purpose of exercising when you exercise? Less Than 15 Minutes       PAST MEDICAL HISTORY:  Past Medical History:   Diagnosis Date     Arthritis      Hypertension      Osteopenia            1/13/2025     8:17 AM   Work/Social History Reviewed With Patient   My employment status is Retired   What is your marital status? Single   If you have children, are they overweight? No   Who do you live with? No one   Who does the  food shopping? Myself           1/13/2025     8:17 AM   Mental Health History Reviewed With Patient   Have you ever been physically or sexually abused? No   How often in the past 2 weeks have you felt little interest or pleasure in doing things? For Several Days   Over the past 2 weeks how often have you felt down, depressed, or hopeless? For Several Days           1/13/2025     8:17 AM   Sleep History Reviewed With Patient   How many hours do you sleep at night? 8       MEDICATIONS:   Current Outpatient Medications   Medication Sig Dispense Refill     acetaminophen-caffeine (EXCEDRIN TENSION HEADACHE) 500-65 MG TABS Take 1-2 tablets by mouth       albuterol (PROAIR HFA/PROVENTIL HFA/VENTOLIN HFA) 108 (90 Base) MCG/ACT inhaler        amoxicillin (AMOXIL) 500 MG capsule TAKE 4 CAPSULES BY MOUTH ONE HOUR BEFORE APPOINTMENT 8 capsule 0     atenolol (TENORMIN) 50 MG tablet TAKE ONE TABLET BY MOUTH ONCE DAILY 90 tablet 2     Calcium Carb-Cholecalciferol (CALTRATE 600+D3) 600-20 MG-MCG TABS        hydrochlorothiazide (HYDRODIURIL) 25 MG tablet Take 1 tablet (25 mg) by mouth daily. 90 tablet 3     ibuprofen (ADVIL) 200 MG tablet Take by mouth every 6 hours       loratadine (CLARITIN) 10 MG tablet Take 1 tablet (10 mg) by mouth daily. 90 tablet 3     mometasone (NASONEX) 50 MCG/ACT nasal spray Spray 2 sprays into both nostrils daily 17 g 1     omeprazole (PRILOSEC) 20 MG DR capsule Take 1 capsule (20 mg) by mouth daily 90 capsule 3     pramipexole (MIRAPEX) 0.125 MG tablet Take 1-2 tablets (0.125-0.25 mg) by mouth At Bedtime 180 tablet 3     pravastatin (PRAVACHOL) 10 MG tablet Take 1 tablet (10 mg) by mouth daily 90 tablet 3       ALLERGIES:   Allergies   Allergen Reactions     Diatrizoate Itching     Iodine      Tolerates topical iodine     Other Environmental Allergy      Metal causes rash leads to infection     Potassium Iodide Hives     Sodium Iodide Hives     Sulfamethizole Hives     Trimethoprim Hives      Ciprofloxacin Hives and Itching     Was itchy all over with cipro infusion.  Went away when infusion was stopped.       Hydromorphone Itching     Does not recall having reaction, except had around same time as Cipro with allergy.      ROS  General  Fatigue: yes, often naps  HEENT  Hx of glaucoma: no  Cardiovascular  Hx of heart disease: no  Palpitations: no  Pulmonary  Shortness of breath at rest: no  Shortness of breath with exertion: yes  Gastrointestinal  Pancreatitis: no  Psychiatric  Moods Stable: somewhat  Endocrine  Polydipsia: no  No personal or family history of medullary thyroid cancer: no  No personal or family history of MEN2   Neurologic  Hx of seizures: no  Migraine headaches: history of    Birth control: menopause  Kidney stones: no     PHYSICAL EXAM:  Wt Readings from Last 4 Encounters:   01/13/25 90.6 kg (199 lb 12.8 oz)   09/10/24 90 kg (198 lb 6 oz)   04/16/24 89.5 kg (197 lb 6 oz)   09/06/23 88 kg (194 lb)      BP Readings from Last 3 Encounters:   09/10/24 128/80   04/16/24 134/84   09/06/23 110/72      GENERAL: alert and no distress  EYES: Eyes grossly normal to inspection.  No discharge or erythema, or obvious scleral/conjunctival abnormalities.  RESP: No audible wheeze, cough, or visible cyanosis.    SKIN: Visible skin clear. No significant rash, abnormal pigmentation or lesions.  NEURO: Cranial nerves grossly intact.  Mentation and speech appropriate for age.  PSYCH: Appropriate affect, tone, and pace of words     FOLLOW-UP:   next available with myself     Total time spent on the date of this encounter doing: chart review, review of test results, patient visit, physical exam, education, counseling, developing plan of care and documenting = 80 minutes.     Sincerely,    JAROD Alatorre MD            Again, thank you for allowing me to participate in the care of your patient.        Sincerely,        JAROD Alatorre MD    Electronically signed

## 2025-01-13 NOTE — PATIENT INSTRUCTIONS
Eat Better ? Move More ? Live Well    Eat 3 nutrient-rich meals each day    Don t skip meals--it will cause you to overeat later in the day!    Eating fiber (vegetables/fruits/whole grains) and protein with meals helps you stay full longer    Choose foods with less than 10 grams of sugar and 5 grams of fat per serving to prevent excess calories and weight gain  Eat around the same times each day to develop a routine eating schedule   Avoid snacking unless physically hungry.   Planned snacks: 1-2 times per day and no more than 150 calories    Eat protein first   Protein helps with healing, maintaining adequate muscle mass, reducing hunger and optimizing nutritional status   Aim for 60-80 grams of protein per day   Fill up on Fiber   Fiber comes from plants--fruits, veggies, whole grains, nuts/seeds and beans   Fiber is low in calories, high in phytonutrients and helps you stay full longer   Aim for 25-35 grams per day by eating fiber with meals and snacks  Eat S-[id number]   Take 20-30 minutes to eat each meal by taking small bites, chewing foods to applesauce consistency or 20-30 times before you swallow   Eating foods too fast can delay satiety/fullness signals and increase overeating   Slow down your eating by using toddler utensils, putting your fork/spoon down between bites and not watching TV or emailing during meals!   Keep a Journal         Writing down what you eat, how you feel and when you are active helps you identify new changes to work on from week to week         Look for ways to cut 100 calories from your current diet 2-3 times per day  Drink 64 ounces of 0-Calorie drinks between meals   Water   Zero calorie Propel  or Vitamin Water     SoBe [place]  Zero Calories   Crystal Light , Sugar-Free Jose-Aid , and other sugar-free lemonade or flavored sanon   Keep Caffeine to less than 300mg per day ie: 3-6oz cups coffee     Work up to 45-60 minutes of physical activity most days of the week   Helps with  losing weight and prevent regaining those extra pounds!    Do a combo of cardio (walking/water exercises) and strength training (lifting weights/Vinyasa yoga)    Avoid Mindless Eating   Be present when you eat--take note of the smell, taste and quality of your food   Make a list of alternative activities you could do to prevent eating out of boredom/stress  Go for a walk, call a friend, chew gum, paint your nails, re-organize the garage, etc      LEAN PROTEIN SOURCES      Protein Source Portion Calories Grams of Protein                           Nonfat, plain Greek yogurt    (10 grams sugar or less) 3/4 cup (6 oz)  12-17   Light Yogurt (10 grams sugar or less) 3/4 cup (6 oz)  6-8   Protein Shake 1 shake 110-180 15-30   Skim/1% Milk or lactose-free milk 1 cup ( 8 oz)  8   Plain or light, flavored soymilk 1 cup  7-8   Plain or light, hemp milk 1 cup 110 6   Fat Free or 1% Cottage Cheese 1/2 cup 90 15   Part skim ricotta cheese 1/2 cup 100 14   Part skim or reduced fat cheese slices 1 ounce 65-80 8     Mozzarella String Cheese 1 80 8   Canned tuna, chicken, crab or salmon  (canned in water)  1/2 cup 100 15-20   White fish (broiled, grilled, baked) 3 ounces 100 21   [place]/Tuna (broiled, grilled, baked) 3 ounces 150-180 21   Shrimp, Scallops, Lobster, Crab 3 ounces 100 21   Pork loin, Pork Tenderloin 3 ounces 150 21   Boneless, skinless chicken /turkey breast                          (broiled, grilled, baked) 3 ounces 120 21   [place], [place], [place], and Venison 3 ounces 120 21   Lean cuts of red meat and pork (sirloin,   round, tenderloin, flank, ground 93%-96%) 3 ounces 170 21   Lean or Extra Lean Ground Turkey 1/2 cup 150 20   90-95% Lean [place] Burger 1 guillaume 140-180 21   Low-fat casserole with lean meat 3/4 cup 200 17   Luncheon Meats                                                        (turkey, lean ham, roast beef, chicken) 3 ounces 100 21   Egg (boiled, poached, scrambled) 1 Egg 60 7   Egg  Substitute 1/2 cup 70 10   Nuts (limit to 1 serving per day)  3 Tbsp. 150 7   Nut [place] (peanut, almond)  Limit to 1 serving or less daily 1 Tbsp. 90 4   Soy Burger (varies) 1  15   Garbanzo, Black, [place] Beans 1/2 cup 110 7   Refried Beans 1/2 cup 100 7   Kidney and Lima beans 1/2 cup 110 7   Tempeh 3 oz 175 18   Vegan crumbles 1/2 cup 100 14   Tofu 1/2 cup 110 14   Chili (beans and extra lean beef or turkey) 1 cup 200 23   Lentil Stew/Soup 1 cup 150 12   Black Bean Soup 1 cup 175 12              Here are the [employer] exercise sites:    Yoga-https://www.[employer]/user/yogawithadriene    Body strength activities, dance, high intensity interval training- https://www.[employer]/user/popsugartvfit    Strength training- https://www.[employer]/user/KozakSportsPerform    Walking- https://www.[employer]/user/walkathomemedia    Sit and Be Fit- https://www.[employer]/channel/UCLgvL3aGzMByecNYtMcyK_g    Low impact cardio- Senait Gustafson- https://www.[employer]/channel/BCayARxGmlfSnmeE6zrmvbwI    Cardio Tiffany Abraham- https://www.[employer]/channel/BEHiNcpx9RYvVAGET7jCvSTm    30 Min low impact cardio- https://www.Lingoingube.com/watch?v=gC_L9qAHVJ8    Body Project- https://www.[employer]/channel/[email or web address]-BhJxQTkCaaF0TW              TOPAMAX  We are considering starting topiramate at bedtime.  Start 1/2 tab, 50 mg, for a week. Go up to a full tab after 1 week if tolerated. Contact the nurse via [employer]ort[employer] or call [phone] if you have any questions or concerns. (Do not stop taking it if you don't think it's working. For some people it works even though they do not feel much different.)    Topiramate (Topamax) is a medication that is used most often to treat migraine headaches or for seizures. It has also been found to help with weight loss. Although it's not currently FDA approved for weight loss, it has been used safely for a number of years to help people who are carrying extra weight.     Just how topiramate helps  with weight loss has not been exactly determined. However it seems to work on areas of the brain to quiet down signals related to eating.      Topiramate may make you:    >feel less interest in eating in between meals   >think less about food and eating   >find it easier to push the plate away   >find giving up pop easier    >have an easier time eating less    For some of our patients, the pills work right away. They feel and think quite differently about food. Other patients don't feel much of a change but find in fact they have lost weight! Like all weight loss medications, topiramate works best when you help it work.  This means:   1) Have less tempting high calorie (fattening) food around the house or office    2) Have lower calorie food (fruits, vegetables,low fat meats and dairy) for snacks    3) Eat out only one time or less each week.   4) Eat your meals at a table with the TV or computer off.    Side-effects. Topiramate is generally well tolerated. The main side-effects we see are:   Tingling in hands,feet, or face (usually not very troublesome)   Mental confusion and word finding trouble (about 10% of patients have this.)     Feeling sleepy or a bit dopey- this goes away very soon after starting.    One of the dangers of topiramate is the possibility of birth defects--if you get pregnant when you are on it, there is the risk that your baby will be born with a cleft lip or palate.  If you are on topiramate and of child bearing age, you need to be on a reliable form of birth control or refrain from sexual intercourse.     Please refer to the pharmacy insert for more information on side-effects. Since many pharmacists are not familiar with the use of topiramate in weight loss, calling the clinic will get you the most accurate information on the use of this medication for weight loss.     In order to get refills of this or any medication we prescribe you must be seen in the medical weight mgmt clinic every 2-3  months.

## 2025-01-13 NOTE — PROGRESS NOTES
"    New Medical Weight Management Consult    PATIENT:  Kelly Campbell  MRN:         [id number]  :         1950  HORACIO:         2025    Dear Dr. Mcclure,    I had the pleasure of seeing your patient, Kelly Campbell. Full intake/assessment was done to determine barriers to weight loss success and develop a treatment plan. Kelly Campbell is a 74 year old female interested in treatment of medical problems associated with excess weight. She has a height of 5' 2\", a weight of 199 lbs 12.8 oz, and the calculated Body mass index is 36.54 kg/m .    ASSESSMENT/PLAN:  1. Morbid obesity (H) (Primary)  We discussed healthy habits to assist with weight loss. She is going to work on decreasing her potatoes and bread and increasing vegetables. She will work on cutting back on soda. We discussed medication that may assist with weight loss. Topamax was prescribed. Risks/ benefits and possible side effects were discussed and questions were answered. Written information was given.      2. Benign essential HTN  This may improve with healthy habits and weight loss.     3. Gastroesophageal reflux disease without esophagitis  This may improve with healthy habits and weight loss.     4. Hypercholesteremia  This may improve with healthy habits and weight loss.      She has the following co-morbidities:        2025     8:17 AM   --   I have the following health issues associated with obesity High Blood Pressure    High Cholesterol    GERD (Reflux)    Stress Incontinence   I have the following symptoms associated with obesity Lower Extremity Swelling    Back Pain    Fatigue           2025     8:17 AM   Referring Provider   Please name the provider who referred you to Medical Weight Management  If you do not know, please answer \"I Don't Know\" Dr. Mimi Mcclure           2025     8:17 AM   Weight History   How concerned are you about your weight? Very Concerned   I became overweight After Pregnancy   The " following factors have contributed to my weight gain A Health Crisis    Lack of Exercise    Genetic (Runs in the Family)   I have tried the following methods to lose weight Watching Portions or Calories    Weight Watchers   My lowest weight since age 16 was 100   My highest weight since age 16 was 201   The most weight I have ever lost was (lbs) 28   I have the following family history of obesity/being overweight My mother is overweight   How has your weight changed over the last year? Gained   How many pounds? 10           1/13/2025     8:17 AM   Diet Recall Review with Patient   If you do eat breakfast, what types of food do you eat? (Late because she sleep late) Eggs, meat, toast or fruitand toast, or a bowl of cereal- corn flakes or grape nuts.  Pancake and meat often on the weekends.   If you do eat supper, what types of food do you typically eat? Meat, potato, vegetables, sometimes desert   If you do snack, what types of food do you typically eat? Stays up late (often midnight or later) Popcorn or occasionally bagged chips or bread and peanut butter   How many glasses of juice do you drink in a typical day? 12 oz   How many of glasses of milk do you drink in a typical day? 2   If you do drink milk, what type? 2%   How many 8oz glasses of sugar containing drinks such as Jose-Aid/sweet tea do you drink in a day? 0   How many cans/bottles of sugar pop/soda/tea/sports drinks do you drink in a day? 2-3 cans of soda   How many cans/bottles of diet pop/soda/tea or sports drink do you drink in a day? 0   How often do you have a drink of alcohol? Monthly or Less   If you do drink, how many drinks might you have in a day?    Water intake: 8 oz every other day 1 or 2           1/13/2025     8:17 AM   Eating Habits   Generally, my meals include foods like these bread, pasta, rice, potatoes, corn, crackers, sweet dessert, pop, or juice Almost Everyday   Generally, my meals include foods like these fried meats, brats,  burgers, french fries, pizza, cheese, chips, or ice cream Once a Week   Eat fast food (like McDonalds, Burger Heri, Taco Bell) Less Than Weekly   Eat at a buffet or sit-down restaurant Less Than Weekly   Eat most of my meals in front of the TV or computer A Few Times a Week   Often skip meals, eat at random times, have no regular eating times Almost Everyday   Rarely sit down for a meal but snack or graze throughout Less Than Weekly   Eat extra snacks between meals Less Than Weekly   Eat most of my food at the end of the day A Few Times a Week   Eat in the middle of the night or wake up at night to eat Never   Eat extra snacks to prevent or correct low blood sugar Never   Eat to prevent acid reflux or stomach pain Less Than Weekly   Worry about not having enough food to eat Never   I eat when I am depressed Less Than Weekly   I eat when I am stressed Less Than Weekly   I eat when I am bored Less Than Weekly   I eat when I am anxious Less Than Weekly   I eat when I am happy or as a reward Less Than Weekly   I feel hungry all the time even if I just have eaten Never   Feeling full is important to me Almost Everyday   I finish all the food on my plate even if I am already full Less Than Weekly   I can't resist eating delicious food or walk past the good food/smell Less Than Weekly   I eat/snack without noticing that I am eating Less Than Weekly   I eat when I am preparing the meal Never   I eat more than usual when I see others eating Less Than Weekly   I have trouble not eating sweets, ice cream, cookies, or chips if they are around the house Less Than Weekly   I think about food all day Less Than Weekly   What foods, if any, do you crave? Sweets/Candy/Chocolate     Meals not prepared at home per week: 0-1        1/13/2025     8:17 AM   Amount of Food   I feel out of control when eating Monthly   I eat a large amount of food, like a loaf of bread, a box of cookies, a pint/quart of ice cream, all at once Never   I eat  a large amount of food even when I am not hungry Never   I eat rapidly Never   I eat alone because I feel embarrassed and do not want others to see how much I have eaten Never   I eat until I am uncomfortably full Almost Everyday   I feel bad, disgusted, or guilty after I overeat Monthly           1/13/2025     8:17 AM   Activity/Exercise History   How much of a typical 12 hour day do you spend sitting? Half the Day   How much of a typical 12 hour day do you spend lying down? Less Than Half the Day   How much of a typical day do you spend walking/standing? Half the Day   How many hours (not including work) do you spend on the TV/Video Games/Computer/Tablet/Phone? 2-3 Hours   How many times a week are you active for the purpose of exercise? Once a Week, walks to her sister's house which is one block away   What keeps you from being more active? Pain- knees   Shortness of Breath   How many total minutes do you spend doing some activity for the purpose of exercising when you exercise? Less Than 15 Minutes       PAST MEDICAL HISTORY:  Past Medical History:   Diagnosis Date    Arthritis     Hypertension     Osteopenia            1/13/2025     8:17 AM   Work/Social History Reviewed With Patient   My employment status is Retired   What is your marital status? Single   If you have children, are they overweight? No   Who do you live with? No one   Who does the food shopping? Myself           1/13/2025     8:17 AM   Mental Health History Reviewed With Patient   Have you ever been physically or sexually abused? No   How often in the past 2 weeks have you felt little interest or pleasure in doing things? For Several Days   Over the past 2 weeks how often have you felt down, depressed, or hopeless? For Several Days           1/13/2025     8:17 AM   Sleep History Reviewed With Patient   How many hours do you sleep at night? 8       MEDICATIONS:   Current Outpatient Medications   Medication Sig Dispense Refill     acetaminophen-caffeine (EXCEDRIN TENSION HEADACHE) 500-65 MG TABS Take 1-2 tablets by mouth      albuterol (PROAIR HFA/PROVENTIL HFA/VENTOLIN HFA) 108 (90 Base) MCG/ACT inhaler       amoxicillin (AMOXIL) 500 MG capsule TAKE 4 CAPSULES BY MOUTH ONE HOUR BEFORE APPOINTMENT 8 capsule 0    atenolol (TENORMIN) 50 MG tablet TAKE ONE TABLET BY MOUTH ONCE DAILY 90 tablet 2    Calcium Carb-Cholecalciferol (CALTRATE 600+D3) 600-20 MG-MCG TABS       hydrochlorothiazide (HYDRODIURIL) 25 MG tablet Take 1 tablet (25 mg) by mouth daily. 90 tablet 3    ibuprofen (ADVIL) 200 MG tablet Take by mouth every 6 hours      loratadine (CLARITIN) 10 MG tablet Take 1 tablet (10 mg) by mouth daily. 90 tablet 3    mometasone (NASONEX) 50 MCG/ACT nasal spray Spray 2 sprays into both nostrils daily 17 g 1    omeprazole (PRILOSEC) 20 MG DR capsule Take 1 capsule (20 mg) by mouth daily 90 capsule 3    pramipexole (MIRAPEX) 0.125 MG tablet Take 1-2 tablets (0.125-0.25 mg) by mouth At Bedtime 180 tablet 3    pravastatin (PRAVACHOL) 10 MG tablet Take 1 tablet (10 mg) by mouth daily 90 tablet 3       ALLERGIES:   Allergies   Allergen Reactions    Diatrizoate Itching    Iodine      Tolerates topical iodine    Other Environmental Allergy      Metal causes rash leads to infection    Potassium Iodide Hives    Sodium Iodide Hives    Sulfamethizole Hives    Trimethoprim Hives    Ciprofloxacin Hives and Itching     Was itchy all over with cipro infusion.  Went away when infusion was stopped.      Hydromorphone Itching     Does not recall having reaction, except had around same time as Cipro with allergy.      ROS  General  Fatigue: yes, often naps  HEENT  Hx of glaucoma: no  Cardiovascular  Hx of heart disease: no  Palpitations: no  Pulmonary  Shortness of breath at rest: no  Shortness of breath with exertion: yes  Gastrointestinal  Pancreatitis: no  Psychiatric  Moods Stable: somewhat  Endocrine  Polydipsia: no  No personal or family history of medullary  thyroid cancer: no  No personal or family history of MEN2   Neurologic  Hx of seizures: no  Migraine headaches: history of    Birth control: menopause  Kidney stones: no     PHYSICAL EXAM:  Wt Readings from Last 4 Encounters:   01/13/25 90.6 kg (199 lb 12.8 oz)   09/10/24 90 kg (198 lb 6 oz)   04/16/24 89.5 kg (197 lb 6 oz)   09/06/23 88 kg (194 lb)      BP Readings from Last 3 Encounters:   09/10/24 128/80   04/16/24 134/84   09/06/23 110/72      GENERAL: alert and no distress  EYES: Eyes grossly normal to inspection.  No discharge or erythema, or obvious scleral/conjunctival abnormalities.  RESP: No audible wheeze, cough, or visible cyanosis.    SKIN: Visible skin clear. No significant rash, abnormal pigmentation or lesions.  NEURO: Cranial nerves grossly intact.  Mentation and speech appropriate for age.  PSYCH: Appropriate affect, tone, and pace of words     FOLLOW-UP:   next available with myself     Total time spent on the date of this encounter doing: chart review, review of test results, patient visit, physical exam, education, counseling, developing plan of care and documenting = 80 minutes.     Sincerely,    JAROD Alatorre MD

## 2025-01-13 NOTE — PROGRESS NOTES
Virtual Visit Details    Type of service:  Video Visit   Video Start Time: 1:14 PM  Video End Time:2:07 PM- switched to telephone later due to technical issues    Originating Location (pt. Location): Home    Distant Location (provider location):  Off-site  Platform used for Video Visit: Blanca regan

## 2025-01-13 NOTE — NURSING NOTE
Is the patient currently in the state of [place]? YES    Location: home    Visit mode:VIDEO    If the visit is dropped, the patient can be reconnected by: VIDEO VISIT: Text to cell phone:   Telephone Information:   Mobile [phone]       Will anyone else be joining the visit? NO  (If patient encounters technical issues they should call [phone]    Are changes needed to the allergy or medication list? No    Are refills needed on medications prescribed by this physician? NO    Reason for visit: Consult      Cris Trujillo, Virtual Visit Facilitator    QNR Status: completed    Pt had some a/v connection issues, video somewhat choppy. Advised patient if issues return she may need to try using her phone on data versus wifi.

## 2025-01-23 ENCOUNTER — TELEPHONE (OUTPATIENT)
Dept: SURGERY | Facility: CLINIC | Age: 75
End: 2025-01-23
Payer: COMMERCIAL

## 2025-01-23 NOTE — TELEPHONE ENCOUNTER
Pt called in to report that she doesn't want to take the Topamax anymore because she doesn't like how it makes her feel. She took 1/2 tablet for a week and spent most of her time on the couch. Yesterday, she took a full tablet and says she didn't get out of bed all day. She is also having some lightheadedness. She will stop taking the medication as of right now and work on the diet/activity instructions that she got from  at her visit. I asked her to call with any further questions or concerns and she verbalized understanding.     Mackenzie Isaac RN, CBN  [facility]  P [phone]  F [phone]

## 2025-01-27 ENCOUNTER — MYC REFILL (OUTPATIENT)
Dept: FAMILY MEDICINE | Facility: CLINIC | Age: 75
End: 2025-01-27
Payer: COMMERCIAL

## 2025-01-27 DIAGNOSIS — G25.81 RESTLESS LEGS SYNDROME: ICD-10-CM

## 2025-01-28 RX ORDER — PRAMIPEXOLE DIHYDROCHLORIDE 0.12 MG/1
.125-.25 TABLET ORAL AT BEDTIME
Qty: 180 TABLET | Refills: 2 | Status: SHIPPED | OUTPATIENT
Start: 2025-01-28

## 2025-03-13 DIAGNOSIS — E78.5 HYPERLIPIDEMIA LDL GOAL <100: ICD-10-CM

## 2025-03-13 RX ORDER — PRAVASTATIN SODIUM 10 MG
10 TABLET ORAL DAILY
Qty: 90 TABLET | Refills: 1 | Status: SHIPPED | OUTPATIENT
Start: 2025-03-13

## 2025-05-15 ENCOUNTER — ANCILLARY PROCEDURE (OUTPATIENT)
Dept: GENERAL RADIOLOGY | Facility: CLINIC | Age: 75
End: 2025-05-15
Attending: FAMILY MEDICINE
Payer: COMMERCIAL

## 2025-05-15 ENCOUNTER — RESULTS FOLLOW-UP (OUTPATIENT)
Dept: FAMILY MEDICINE | Facility: CLINIC | Age: 75
End: 2025-05-15

## 2025-05-15 ENCOUNTER — OFFICE VISIT (OUTPATIENT)
Dept: FAMILY MEDICINE | Facility: CLINIC | Age: 75
End: 2025-05-15
Payer: COMMERCIAL

## 2025-05-15 VITALS
WEIGHT: 191 LBS | HEART RATE: 65 BPM | SYSTOLIC BLOOD PRESSURE: 109 MMHG | TEMPERATURE: 97.7 F | HEIGHT: 62 IN | DIASTOLIC BLOOD PRESSURE: 53 MMHG | BODY MASS INDEX: 35.15 KG/M2 | OXYGEN SATURATION: 94 %

## 2025-05-15 DIAGNOSIS — R05.1 ACUTE COUGH: ICD-10-CM

## 2025-05-15 DIAGNOSIS — J18.9 COMMUNITY ACQUIRED BILATERAL LOWER LOBE PNEUMONIA: Primary | ICD-10-CM

## 2025-05-15 PROBLEM — E66.01 MORBID OBESITY (H): Status: RESOLVED | Noted: 2022-08-30 | Resolved: 2025-05-15

## 2025-05-15 LAB
FLUAV AG SPEC QL IA: NEGATIVE
FLUBV AG SPEC QL IA: NEGATIVE

## 2025-05-15 RX ORDER — AZITHROMYCIN 250 MG/1
TABLET, FILM COATED ORAL
Qty: 6 TABLET | Refills: 0 | Status: SHIPPED | OUTPATIENT
Start: 2025-05-15 | End: 2025-05-20

## 2025-05-15 RX ORDER — BENZONATATE 100 MG/1
100 CAPSULE ORAL 3 TIMES DAILY PRN
Qty: 20 CAPSULE | Refills: 0 | Status: SHIPPED | OUTPATIENT
Start: 2025-05-15

## 2025-05-15 ASSESSMENT — ENCOUNTER SYMPTOMS: COUGH: 1

## 2025-05-15 NOTE — PROGRESS NOTES
Assessment & Plan       (J18.9) Community acquired bilateral lower lobe pneumonia  (primary encounter diagnosis)  Comment: discussed and will treat with augmentin and zithromax. Add in cough suppressant prn. Discussed supportive cares. Discussed red flags and when to seek immediate treatment. The patient indicates understanding of these issues and agrees with the plan.   Plan: amoxicillin-clavulanate (AUGMENTIN) 875-125 MG         tablet, azithromycin (ZITHROMAX) 250 MG tablet            (R05.1) Acute cough  Comment: discussed   Plan: XR Chest 2 Views, Influenza A & B Antigen -         Clinic Collect              Jeri Mendoza is a 74 year old, presenting for the following health issues:  Cough        5/15/2025    10:53 AM   Additional Questions   Roomed by OLGA Lynch   Accompanied by Self      Cough    History of Present Illness       Reason for visit:  Cough runny nose hot feel crappy   She is taking medications regularly.        Acute Illness  Acute illness concerns: constant cough   Onset/Duration: x5 days  Symptoms:  Fever: did not check temp but thinks maybe low grade fever  Chills/Sweats: YES  Headache (location?): YES  Sinus Pressure: No  Conjunctivitis:  No  Ear Pain: YES- some in beginning of symptoms   Rhinorrhea: YES  Congestion: YES  Sore Throat: YES  Cough: YES- constant, feels vibration in cough.   Wheeze: No  Decreased Appetite: No  Nausea: No  Vomiting: No  Diarrhea: No  Dysuria/Freq.: No  Dysuria or Hematuria: No  Fatigue/Achiness: YES- sleeping more- hasn't gotten out of bed since Sunday   Sick/Strep Exposure: on a cruise , returned home Sunday  Therapies tried and outcome: Excedrin  coricidin daytime/ nighttime .  Tested for covid yesterday- test was negative.     Symptoms started 6 days ago - abrupt nasal congestion and cough  Cough is persistent though she reports it is a bit better today   No fever  No body aches       Review of Systems  Constitutional, HEENT, cardiovascular,  "pulmonary, gi and gu systems are negative, except as otherwise noted.      Objective    /53   Pulse 65   Temp 97.7  F (36.5  C) (Tympanic)   Ht 1.575 m (5' 2\")   Wt 86.6 kg (191 lb)   SpO2 94%   BMI 34.93 kg/m    Body mass index is 34.93 kg/m .  Physical Exam   GENERAL: alert. Coughing frequently. Feels sweaty on exam  EYES: Eyes grossly normal to inspection, PERRL and conjunctivae and sclerae normal  HENT: ear canals and TM's normal, nose and mouth without ulcers or lesions  NECK: no adenopathy, no asymmetry, masses, or scars  RESP: lungs clear to auscultation - no rales, rhonchi or wheezes  CV: regular rate and rhythm, normal S1 S2, no S3 or S4, no murmur, click or rub, no peripheral edema  MS: no gross musculoskeletal defects noted, no edema  SKIN: no suspicious lesions or rashes  NEURO: Normal strength and tone, mentation intact and speech normal  PSYCH: mentation appears normal, affect normal/bright    chest x-ray   I independently visualized the xray and my findings were patchy infiltrate is seen in the bilateral mid lung fields .   Radiology review pending.      Flu negative         Signed Electronically by: Yudi Patrick MD    "